# Patient Record
Sex: MALE | Race: WHITE | NOT HISPANIC OR LATINO | Employment: UNEMPLOYED | URBAN - METROPOLITAN AREA
[De-identification: names, ages, dates, MRNs, and addresses within clinical notes are randomized per-mention and may not be internally consistent; named-entity substitution may affect disease eponyms.]

---
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
## 2023-01-01 ENCOUNTER — HOSPITAL ENCOUNTER (EMERGENCY)
Facility: HOSPITAL | Age: 18
Discharge: HOME | End: 2023-01-01
Attending: EMERGENCY MEDICINE
Payer: MEDICAID

## 2023-01-01 VITALS
BODY MASS INDEX: 19.25 KG/M2 | SYSTOLIC BLOOD PRESSURE: 123 MMHG | HEART RATE: 98 BPM | TEMPERATURE: 98 F | WEIGHT: 127 LBS | HEIGHT: 68 IN | OXYGEN SATURATION: 98 % | RESPIRATION RATE: 16 BRPM | DIASTOLIC BLOOD PRESSURE: 65 MMHG

## 2023-01-01 DIAGNOSIS — H60.11 CELLULITIS OF RIGHT PINNA: Primary | ICD-10-CM

## 2023-01-01 PROCEDURE — 25000003 PHARM REV CODE 250: Mod: ER | Performed by: EMERGENCY MEDICINE

## 2023-01-01 PROCEDURE — 99284 EMERGENCY DEPT VISIT MOD MDM: CPT | Mod: ER

## 2023-01-01 RX ORDER — CEPHALEXIN 500 MG/1
500 CAPSULE ORAL
Status: COMPLETED | OUTPATIENT
Start: 2023-01-01 | End: 2023-01-01

## 2023-01-01 RX ORDER — CEPHALEXIN 500 MG/1
500 CAPSULE ORAL 4 TIMES DAILY
Qty: 20 CAPSULE | Refills: 0 | Status: SHIPPED | OUTPATIENT
Start: 2023-01-01 | End: 2023-01-06

## 2023-01-01 RX ORDER — MUPIROCIN 20 MG/G
OINTMENT TOPICAL 3 TIMES DAILY
Qty: 22 G | Refills: 0 | Status: ON HOLD | OUTPATIENT
Start: 2023-01-01 | End: 2024-01-06 | Stop reason: HOSPADM

## 2023-01-01 RX ORDER — MUPIROCIN 20 MG/G
OINTMENT TOPICAL
Status: COMPLETED | OUTPATIENT
Start: 2023-01-01 | End: 2023-01-01

## 2023-01-01 RX ADMIN — MUPIROCIN: 20 OINTMENT TOPICAL at 08:01

## 2023-01-01 RX ADMIN — CEPHALEXIN 500 MG: 500 CAPSULE ORAL at 08:01

## 2023-01-01 NOTE — Clinical Note
"Rohan"Miko Chin was seen and treated in our emergency department on 1/1/2023.  He may return to work on 01/02/2023.       If you have any questions or concerns, please don't hesitate to call.      Meagan Maldonado RN    "

## 2023-01-02 NOTE — ED PROVIDER NOTES
Encounter Date: 1/1/2023       History     Chief Complaint   Patient presents with    Ear Fullness     Right ear discomfort x 2 years     The history is provided by the patient.   Ear Fullness  This is a new problem. Episode onset: 1 year. The problem occurs rarely. The problem has not changed since onset.Pertinent negatives include no chest pain, no abdominal pain, no headaches and no shortness of breath. Nothing aggravates the symptoms. Nothing relieves the symptoms. Pt reports ear fullness, and states he picks at his outer ear causing a wound that is now infected, red and painful.    Review of patient's allergies indicates:  No Known Allergies  No past medical history on file.  No past surgical history on file.  No family history on file.  Social History     Tobacco Use    Smoking status: Never    Smokeless tobacco: Never   Substance Use Topics    Alcohol use: No    Drug use: Yes     Frequency: 1.0 times per week     Types: Marijuana     Review of Systems   Constitutional:  Negative for fever.   HENT:  Positive for ear pain.    Eyes:  Negative for photophobia.   Respiratory:  Negative for shortness of breath.    Cardiovascular:  Negative for chest pain.   Gastrointestinal:  Negative for abdominal pain.   Musculoskeletal:  Negative for back pain.   Neurological:  Negative for headaches.   Hematological:  Does not bruise/bleed easily.     Physical Exam     Initial Vitals   BP Pulse Resp Temp SpO2   01/01/23 1959 01/01/23 1959 01/01/23 1959 01/01/23 2001 01/01/23 1959   123/65 98 16 97.9 °F (36.6 °C) 98 %      MAP       --                Physical Exam    Nursing note and vitals reviewed.  Constitutional: He appears well-developed and well-nourished.   HENT:   Head: Normocephalic and atraumatic.   Right Ear: Hearing, tympanic membrane and ear canal normal.   Mouth/Throat: Oropharynx is clear and moist.   Right external ear, crusted wound c surrounding erythema antitragus No fluctuance Mild induration   Eyes:  Conjunctivae and EOM are normal. Pupils are equal, round, and reactive to light.   Neck: Neck supple.   Normal range of motion.  Cardiovascular:  Normal rate, regular rhythm and normal heart sounds.           Pulmonary/Chest: Breath sounds normal.   Abdominal: Abdomen is soft. Bowel sounds are normal.   Musculoskeletal:         General: Normal range of motion.      Cervical back: Normal range of motion and neck supple.     Neurological: He is alert and oriented to person, place, and time. He has normal strength.   Skin: Skin is warm and dry.   Psychiatric: He has a normal mood and affect. Thought content normal.       ED Course   Procedures  Labs Reviewed - No data to display       Imaging Results    None     8:27 PM - Counseling: Spoke with the patient and discussed todays findings, in addition to providing specific details for the plan of care and counseling regarding the diagnosis and prognosis. Questions are answered at this time.       Medications   cephALEXin capsule 500 mg (has no administration in time range)   mupirocin 2 % ointment (has no administration in time range)                              Clinical Impression:   Final diagnoses:  [H60.11] Cellulitis of right pinna (Primary)        ED Disposition Condition    Discharge Stable          ED Prescriptions       Medication Sig Dispense Start Date End Date Auth. Provider    cephALEXin (KEFLEX) 500 MG capsule Take 1 capsule (500 mg total) by mouth 4 (four) times daily. for 5 days 20 capsule 1/1/2023 1/6/2023 Balaji Myers MD    mupirocin (BACTROBAN) 2 % ointment Apply topically 3 (three) times daily. 22 g 1/1/2023 -- Balaji Myers MD          Follow-up Information       Follow up With Specialties Details Why Contact Info    ENT  Call in 1 week As needed     PCP  Call in 3 days For wound re-check, ENT referral              Balaji Myers MD  01/01/23 2027

## 2023-08-24 ENCOUNTER — HOSPITAL ENCOUNTER (OUTPATIENT)
Facility: HOSPITAL | Age: 18
Discharge: LEFT BEFORE DISCHARGE | End: 2023-08-25
Attending: EMERGENCY MEDICINE | Admitting: INTERNAL MEDICINE
Payer: MEDICAID

## 2023-08-24 DIAGNOSIS — R09.02 HYPOXIA: Primary | ICD-10-CM

## 2023-08-24 DIAGNOSIS — R06.00 DYSPNEA: ICD-10-CM

## 2023-08-24 DIAGNOSIS — R21 RASH: ICD-10-CM

## 2023-08-24 DIAGNOSIS — R00.0 TACHYCARDIA: ICD-10-CM

## 2023-08-24 DIAGNOSIS — R07.9 CHEST PAIN: ICD-10-CM

## 2023-08-24 DIAGNOSIS — R06.2 WHEEZING: ICD-10-CM

## 2023-08-24 PROBLEM — H60.90 OTITIS EXTERNA: Status: ACTIVE | Noted: 2023-08-24

## 2023-08-24 PROBLEM — J18.9 BILATERAL PNEUMONIA: Status: ACTIVE | Noted: 2023-08-24

## 2023-08-24 PROBLEM — J20.9 ACUTE BRONCHITIS: Status: ACTIVE | Noted: 2023-08-24

## 2023-08-24 PROBLEM — J96.01 ACUTE HYPOXEMIC RESPIRATORY FAILURE: Status: ACTIVE | Noted: 2023-08-24

## 2023-08-24 LAB
ALBUMIN SERPL BCP-MCNC: 4.7 G/DL (ref 3.5–5.2)
ALLENS TEST: ABNORMAL
ALP SERPL-CCNC: 61 U/L (ref 55–135)
ALT SERPL W/O P-5'-P-CCNC: 11 U/L (ref 10–44)
ANION GAP SERPL CALC-SCNC: 17 MMOL/L (ref 8–16)
AST SERPL-CCNC: 19 U/L (ref 10–40)
BASOPHILS # BLD AUTO: 0.14 K/UL (ref 0–0.2)
BASOPHILS NFR BLD: 1 % (ref 0–1.9)
BILIRUB SERPL-MCNC: 0.7 MG/DL (ref 0.1–1)
BILIRUB UR QL STRIP: ABNORMAL
BUN SERPL-MCNC: 7 MG/DL (ref 6–20)
CALCIUM SERPL-MCNC: 10.1 MG/DL (ref 8.7–10.5)
CHLORIDE SERPL-SCNC: 101 MMOL/L (ref 95–110)
CLARITY UR REFRACT.AUTO: CLEAR
CO2 SERPL-SCNC: 20 MMOL/L (ref 23–29)
COLOR UR AUTO: YELLOW
CREAT SERPL-MCNC: 0.8 MG/DL (ref 0.5–1.4)
CTP QC/QA: YES
CTP QC/QA: YES
DELSYS: ABNORMAL
DIFFERENTIAL METHOD: ABNORMAL
EOSINOPHIL # BLD AUTO: 0.8 K/UL (ref 0–0.5)
EOSINOPHIL NFR BLD: 5.6 % (ref 0–8)
ERYTHROCYTE [DISTWIDTH] IN BLOOD BY AUTOMATED COUNT: 11.9 % (ref 11.5–14.5)
EST. GFR  (NO RACE VARIABLE): >60 ML/MIN/1.73 M^2
GLUCOSE SERPL-MCNC: 96 MG/DL (ref 70–110)
GLUCOSE UR QL STRIP: NEGATIVE
HCO3 UR-SCNC: 18.1 MMOL/L (ref 24–28)
HCT VFR BLD AUTO: 51.2 % (ref 40–54)
HGB BLD-MCNC: 18.3 G/DL (ref 14–18)
HGB UR QL STRIP: NEGATIVE
IMM GRANULOCYTES # BLD AUTO: 0.04 K/UL (ref 0–0.04)
IMM GRANULOCYTES NFR BLD AUTO: 0.3 % (ref 0–0.5)
KETONES UR QL STRIP: ABNORMAL
LACTATE SERPL-SCNC: 1 MMOL/L (ref 0.5–2.2)
LEUKOCYTE ESTERASE UR QL STRIP: NEGATIVE
LYMPHOCYTES # BLD AUTO: 1.2 K/UL (ref 1–4.8)
LYMPHOCYTES NFR BLD: 8.2 % (ref 18–48)
MCH RBC QN AUTO: 32 PG (ref 27–31)
MCHC RBC AUTO-ENTMCNC: 35.7 G/DL (ref 32–36)
MCV RBC AUTO: 90 FL (ref 82–98)
MODE: ABNORMAL
MONOCYTES # BLD AUTO: 0.6 K/UL (ref 0.3–1)
MONOCYTES NFR BLD: 3.9 % (ref 4–15)
NEUTROPHILS # BLD AUTO: 11.8 K/UL (ref 1.8–7.7)
NEUTROPHILS NFR BLD: 81 % (ref 38–73)
NITRITE UR QL STRIP: NEGATIVE
NRBC BLD-RTO: 0 /100 WBC
PCO2 BLDA: 29.6 MMHG (ref 35–45)
PH SMN: 7.39 [PH] (ref 7.35–7.45)
PH UR STRIP: 6 [PH] (ref 5–8)
PLATELET # BLD AUTO: 384 K/UL (ref 150–450)
PMV BLD AUTO: 10.1 FL (ref 9.2–12.9)
PO2 BLDA: 68 MMHG (ref 80–100)
POC BE: -7 MMOL/L
POC MOLECULAR INFLUENZA A AGN: NEGATIVE
POC MOLECULAR INFLUENZA B AGN: NEGATIVE
POC SATURATED O2: 94 % (ref 95–100)
POTASSIUM SERPL-SCNC: 4.5 MMOL/L (ref 3.5–5.1)
PROCALCITONIN SERPL IA-MCNC: 0.02 NG/ML
PROCALCITONIN SERPL IA-MCNC: 0.02 NG/ML
PROT SERPL-MCNC: 8.3 G/DL (ref 6–8.4)
PROT UR QL STRIP: NEGATIVE
RBC # BLD AUTO: 5.71 M/UL (ref 4.6–6.2)
SAMPLE: ABNORMAL
SARS-COV-2 RDRP RESP QL NAA+PROBE: NEGATIVE
SITE: ABNORMAL
SODIUM SERPL-SCNC: 138 MMOL/L (ref 136–145)
SP GR UR STRIP: >=1.03 (ref 1–1.03)
URN SPEC COLLECT METH UR: ABNORMAL
UROBILINOGEN UR STRIP-ACNC: NEGATIVE EU/DL
WBC # BLD AUTO: 14.56 K/UL (ref 3.9–12.7)

## 2023-08-24 PROCEDURE — 87070 CULTURE OTHR SPECIMN AEROBIC: CPT | Performed by: NURSE PRACTITIONER

## 2023-08-24 PROCEDURE — 87502 INFLUENZA DNA AMP PROBE: CPT | Mod: ER

## 2023-08-24 PROCEDURE — 84145 PROCALCITONIN (PCT): CPT | Mod: 91 | Performed by: NURSE PRACTITIONER

## 2023-08-24 PROCEDURE — 93010 ELECTROCARDIOGRAM REPORT: CPT | Mod: ,,, | Performed by: INTERNAL MEDICINE

## 2023-08-24 PROCEDURE — 96372 THER/PROPH/DIAG INJ SC/IM: CPT | Mod: 59 | Performed by: NURSE PRACTITIONER

## 2023-08-24 PROCEDURE — 84145 PROCALCITONIN (PCT): CPT | Mod: ER | Performed by: EMERGENCY MEDICINE

## 2023-08-24 PROCEDURE — 63600175 PHARM REV CODE 636 W HCPCS: Performed by: INTERNAL MEDICINE

## 2023-08-24 PROCEDURE — 87077 CULTURE AEROBIC IDENTIFY: CPT | Performed by: NURSE PRACTITIONER

## 2023-08-24 PROCEDURE — 87040 BLOOD CULTURE FOR BACTERIA: CPT | Mod: 59 | Performed by: EMERGENCY MEDICINE

## 2023-08-24 PROCEDURE — 27100107 HC POCKET PEAK FLOW METER: Mod: ER

## 2023-08-24 PROCEDURE — 96368 THER/DIAG CONCURRENT INF: CPT | Mod: ER

## 2023-08-24 PROCEDURE — G0378 HOSPITAL OBSERVATION PER HR: HCPCS

## 2023-08-24 PROCEDURE — 96365 THER/PROPH/DIAG IV INF INIT: CPT | Mod: ER,59

## 2023-08-24 PROCEDURE — 87635 SARS-COV-2 COVID-19 AMP PRB: CPT | Mod: ER | Performed by: EMERGENCY MEDICINE

## 2023-08-24 PROCEDURE — 36415 COLL VENOUS BLD VENIPUNCTURE: CPT | Performed by: NURSE PRACTITIONER

## 2023-08-24 PROCEDURE — 83605 ASSAY OF LACTIC ACID: CPT | Mod: ER | Performed by: EMERGENCY MEDICINE

## 2023-08-24 PROCEDURE — 25000242 PHARM REV CODE 250 ALT 637 W/ HCPCS: Performed by: INTERNAL MEDICINE

## 2023-08-24 PROCEDURE — 81003 URINALYSIS AUTO W/O SCOPE: CPT | Mod: ER | Performed by: EMERGENCY MEDICINE

## 2023-08-24 PROCEDURE — 99900035 HC TECH TIME PER 15 MIN (STAT): Mod: ER

## 2023-08-24 PROCEDURE — 63600175 PHARM REV CODE 636 W HCPCS: Mod: ER | Performed by: INTERNAL MEDICINE

## 2023-08-24 PROCEDURE — 94640 AIRWAY INHALATION TREATMENT: CPT | Mod: XB

## 2023-08-24 PROCEDURE — 94761 N-INVAS EAR/PLS OXIMETRY MLT: CPT | Mod: ER

## 2023-08-24 PROCEDURE — 94640 AIRWAY INHALATION TREATMENT: CPT | Mod: ER,XB

## 2023-08-24 PROCEDURE — 87186 SC STD MICRODIL/AGAR DIL: CPT | Performed by: NURSE PRACTITIONER

## 2023-08-24 PROCEDURE — 25000003 PHARM REV CODE 250: Mod: ER | Performed by: EMERGENCY MEDICINE

## 2023-08-24 PROCEDURE — 85025 COMPLETE CBC W/AUTO DIFF WBC: CPT | Mod: ER | Performed by: EMERGENCY MEDICINE

## 2023-08-24 PROCEDURE — 96375 TX/PRO/DX INJ NEW DRUG ADDON: CPT

## 2023-08-24 PROCEDURE — 36600 WITHDRAWAL OF ARTERIAL BLOOD: CPT | Mod: ER

## 2023-08-24 PROCEDURE — 96361 HYDRATE IV INFUSION ADD-ON: CPT | Mod: ER

## 2023-08-24 PROCEDURE — 25000242 PHARM REV CODE 250 ALT 637 W/ HCPCS: Performed by: NURSE PRACTITIONER

## 2023-08-24 PROCEDURE — 80053 COMPREHEN METABOLIC PANEL: CPT | Mod: ER | Performed by: EMERGENCY MEDICINE

## 2023-08-24 PROCEDURE — 93010 EKG 12-LEAD: ICD-10-PCS | Mod: ,,, | Performed by: INTERNAL MEDICINE

## 2023-08-24 PROCEDURE — 63600175 PHARM REV CODE 636 W HCPCS: Mod: ER | Performed by: EMERGENCY MEDICINE

## 2023-08-24 PROCEDURE — 87205 SMEAR GRAM STAIN: CPT | Performed by: NURSE PRACTITIONER

## 2023-08-24 PROCEDURE — 96367 TX/PROPH/DG ADDL SEQ IV INF: CPT

## 2023-08-24 PROCEDURE — 63600175 PHARM REV CODE 636 W HCPCS: Performed by: NURSE PRACTITIONER

## 2023-08-24 PROCEDURE — 25500020 PHARM REV CODE 255: Mod: ER | Performed by: INTERNAL MEDICINE

## 2023-08-24 PROCEDURE — 96361 HYDRATE IV INFUSION ADD-ON: CPT

## 2023-08-24 PROCEDURE — 25000003 PHARM REV CODE 250: Performed by: NURSE PRACTITIONER

## 2023-08-24 PROCEDURE — 99291 CRITICAL CARE FIRST HOUR: CPT | Mod: ER

## 2023-08-24 PROCEDURE — 93005 ELECTROCARDIOGRAM TRACING: CPT | Mod: ER

## 2023-08-24 PROCEDURE — 25000242 PHARM REV CODE 250 ALT 637 W/ HCPCS: Mod: ER | Performed by: EMERGENCY MEDICINE

## 2023-08-24 PROCEDURE — 27000221 HC OXYGEN, UP TO 24 HOURS: Mod: ER

## 2023-08-24 PROCEDURE — 99900035 HC TECH TIME PER 15 MIN (STAT)

## 2023-08-24 RX ORDER — SODIUM,POTASSIUM PHOSPHATES 280-250MG
2 POWDER IN PACKET (EA) ORAL
Status: DISCONTINUED | OUTPATIENT
Start: 2023-08-24 | End: 2023-08-26 | Stop reason: HOSPADM

## 2023-08-24 RX ORDER — ARFORMOTEROL TARTRATE 15 UG/2ML
15 SOLUTION RESPIRATORY (INHALATION) 2 TIMES DAILY
Status: DISCONTINUED | OUTPATIENT
Start: 2023-08-24 | End: 2023-08-26 | Stop reason: HOSPADM

## 2023-08-24 RX ORDER — SIMETHICONE 80 MG
1 TABLET,CHEWABLE ORAL 4 TIMES DAILY PRN
Status: DISCONTINUED | OUTPATIENT
Start: 2023-08-24 | End: 2023-08-26 | Stop reason: HOSPADM

## 2023-08-24 RX ORDER — IBUPROFEN 200 MG
16 TABLET ORAL
Status: DISCONTINUED | OUTPATIENT
Start: 2023-08-24 | End: 2023-08-26 | Stop reason: HOSPADM

## 2023-08-24 RX ORDER — IBUPROFEN 200 MG
24 TABLET ORAL
Status: DISCONTINUED | OUTPATIENT
Start: 2023-08-24 | End: 2023-08-26 | Stop reason: HOSPADM

## 2023-08-24 RX ORDER — TALC
6 POWDER (GRAM) TOPICAL NIGHTLY PRN
Status: DISCONTINUED | OUTPATIENT
Start: 2023-08-24 | End: 2023-08-26 | Stop reason: HOSPADM

## 2023-08-24 RX ORDER — HYDROCODONE BITARTRATE AND ACETAMINOPHEN 5; 325 MG/1; MG/1
1 TABLET ORAL EVERY 6 HOURS PRN
Status: DISCONTINUED | OUTPATIENT
Start: 2023-08-24 | End: 2023-08-26 | Stop reason: HOSPADM

## 2023-08-24 RX ORDER — NEOMYCIN SULFATE, POLYMYXIN B SULFATE, HYDROCORTISONE 3.5; 10000; 1 MG/ML; [USP'U]/ML; MG/ML
3 SOLUTION/ DROPS AURICULAR (OTIC) EVERY 6 HOURS
Status: DISCONTINUED | OUTPATIENT
Start: 2023-08-25 | End: 2023-08-24

## 2023-08-24 RX ORDER — ENOXAPARIN SODIUM 100 MG/ML
40 INJECTION SUBCUTANEOUS EVERY 24 HOURS
Status: DISCONTINUED | OUTPATIENT
Start: 2023-08-24 | End: 2023-08-26 | Stop reason: HOSPADM

## 2023-08-24 RX ORDER — NALOXONE HCL 0.4 MG/ML
0.02 VIAL (ML) INJECTION
Status: DISCONTINUED | OUTPATIENT
Start: 2023-08-24 | End: 2023-08-26 | Stop reason: HOSPADM

## 2023-08-24 RX ORDER — MAG HYDROX/ALUMINUM HYD/SIMETH 200-200-20
30 SUSPENSION, ORAL (FINAL DOSE FORM) ORAL 4 TIMES DAILY PRN
Status: DISCONTINUED | OUTPATIENT
Start: 2023-08-24 | End: 2023-08-26 | Stop reason: HOSPADM

## 2023-08-24 RX ORDER — PROCHLORPERAZINE EDISYLATE 5 MG/ML
5 INJECTION INTRAMUSCULAR; INTRAVENOUS EVERY 6 HOURS PRN
Status: DISCONTINUED | OUTPATIENT
Start: 2023-08-24 | End: 2023-08-26 | Stop reason: HOSPADM

## 2023-08-24 RX ORDER — ONDANSETRON 2 MG/ML
4 INJECTION INTRAMUSCULAR; INTRAVENOUS EVERY 6 HOURS PRN
Status: DISCONTINUED | OUTPATIENT
Start: 2023-08-24 | End: 2023-08-26 | Stop reason: HOSPADM

## 2023-08-24 RX ORDER — GLUCAGON 1 MG
1 KIT INJECTION
Status: DISCONTINUED | OUTPATIENT
Start: 2023-08-24 | End: 2023-08-26 | Stop reason: HOSPADM

## 2023-08-24 RX ORDER — IPRATROPIUM BROMIDE AND ALBUTEROL SULFATE 2.5; .5 MG/3ML; MG/3ML
3 SOLUTION RESPIRATORY (INHALATION) EVERY 6 HOURS PRN
Status: DISCONTINUED | OUTPATIENT
Start: 2023-08-24 | End: 2023-08-26 | Stop reason: HOSPADM

## 2023-08-24 RX ORDER — LANOLIN ALCOHOL/MO/W.PET/CERES
800 CREAM (GRAM) TOPICAL
Status: DISCONTINUED | OUTPATIENT
Start: 2023-08-24 | End: 2023-08-26 | Stop reason: HOSPADM

## 2023-08-24 RX ORDER — LEVALBUTEROL INHALATION SOLUTION 0.63 MG/3ML
1.25 SOLUTION RESPIRATORY (INHALATION) EVERY 8 HOURS
Status: DISCONTINUED | OUTPATIENT
Start: 2023-08-25 | End: 2023-08-25

## 2023-08-24 RX ORDER — SODIUM CHLORIDE 0.9 % (FLUSH) 0.9 %
10 SYRINGE (ML) INJECTION EVERY 8 HOURS PRN
Status: DISCONTINUED | OUTPATIENT
Start: 2023-08-24 | End: 2023-08-26 | Stop reason: HOSPADM

## 2023-08-24 RX ORDER — MAGNESIUM SULFATE HEPTAHYDRATE 40 MG/ML
2 INJECTION, SOLUTION INTRAVENOUS ONCE
Status: COMPLETED | OUTPATIENT
Start: 2023-08-24 | End: 2023-08-24

## 2023-08-24 RX ORDER — SODIUM CHLORIDE 9 MG/ML
INJECTION, SOLUTION INTRAVENOUS CONTINUOUS
Status: DISCONTINUED | OUTPATIENT
Start: 2023-08-24 | End: 2023-08-26 | Stop reason: HOSPADM

## 2023-08-24 RX ORDER — BUDESONIDE 0.5 MG/2ML
0.5 INHALANT ORAL EVERY 12 HOURS
Status: DISCONTINUED | OUTPATIENT
Start: 2023-08-24 | End: 2023-08-26 | Stop reason: HOSPADM

## 2023-08-24 RX ORDER — CLOTRIMAZOLE AND BETAMETHASONE DIPROPIONATE 10; .64 MG/G; MG/G
CREAM TOPICAL 2 TIMES DAILY
Status: DISCONTINUED | OUTPATIENT
Start: 2023-08-24 | End: 2023-08-26 | Stop reason: HOSPADM

## 2023-08-24 RX ORDER — CLOTRIMAZOLE AND BETAMETHASONE DIPROPIONATE 10; .64 MG/G; MG/G
CREAM TOPICAL 2 TIMES DAILY
Status: DISCONTINUED | OUTPATIENT
Start: 2023-08-24 | End: 2023-08-24

## 2023-08-24 RX ORDER — LEVALBUTEROL INHALATION SOLUTION 0.63 MG/3ML
0.63 SOLUTION RESPIRATORY (INHALATION)
Status: COMPLETED | OUTPATIENT
Start: 2023-08-24 | End: 2023-08-24

## 2023-08-24 RX ORDER — ACETAMINOPHEN 325 MG/1
650 TABLET ORAL EVERY 4 HOURS PRN
Status: DISCONTINUED | OUTPATIENT
Start: 2023-08-24 | End: 2023-08-26 | Stop reason: HOSPADM

## 2023-08-24 RX ORDER — NEOMYCIN SULFATE, POLYMYXIN B SULFATE, HYDROCORTISONE 3.5; 10000; 1 MG/ML; [USP'U]/ML; MG/ML
3 SOLUTION/ DROPS AURICULAR (OTIC) EVERY 6 HOURS
Status: DISCONTINUED | OUTPATIENT
Start: 2023-08-24 | End: 2023-08-26 | Stop reason: HOSPADM

## 2023-08-24 RX ORDER — POLYETHYLENE GLYCOL 3350 17 G/17G
17 POWDER, FOR SOLUTION ORAL 2 TIMES DAILY PRN
Status: DISCONTINUED | OUTPATIENT
Start: 2023-08-24 | End: 2023-08-26 | Stop reason: HOSPADM

## 2023-08-24 RX ADMIN — CLOTRIMAZOLE AND BETAMETHASONE DIPROPIONATE: 10; .64 CREAM TOPICAL at 10:08

## 2023-08-24 RX ADMIN — CEFTRIAXONE SODIUM 1 G: 1 INJECTION, POWDER, FOR SOLUTION INTRAMUSCULAR; INTRAVENOUS at 06:08

## 2023-08-24 RX ADMIN — ONDANSETRON 4 MG: 2 INJECTION INTRAMUSCULAR; INTRAVENOUS at 11:08

## 2023-08-24 RX ADMIN — IPRATROPIUM BROMIDE AND ALBUTEROL SULFATE 3 ML: .5; 3 SOLUTION RESPIRATORY (INHALATION) at 10:08

## 2023-08-24 RX ADMIN — METHYLPREDNISOLONE SODIUM SUCCINATE 60 MG: 40 INJECTION, POWDER, FOR SOLUTION INTRAMUSCULAR; INTRAVENOUS at 10:08

## 2023-08-24 RX ADMIN — BUDESONIDE 0.5 MG: 0.5 INHALANT ORAL at 10:08

## 2023-08-24 RX ADMIN — IOHEXOL 100 ML: 350 INJECTION, SOLUTION INTRAVENOUS at 06:08

## 2023-08-24 RX ADMIN — LEVALBUTEROL HYDROCHLORIDE 0.63 MG: 0.63 SOLUTION RESPIRATORY (INHALATION) at 03:08

## 2023-08-24 RX ADMIN — ENOXAPARIN SODIUM 40 MG: 40 INJECTION SUBCUTANEOUS at 10:08

## 2023-08-24 RX ADMIN — SODIUM CHLORIDE: 9 INJECTION, SOLUTION INTRAVENOUS at 11:08

## 2023-08-24 RX ADMIN — MAGNESIUM SULFATE IN WATER 2 G: 40 INJECTION, SOLUTION INTRAVENOUS at 06:08

## 2023-08-24 RX ADMIN — AZITHROMYCIN MONOHYDRATE 500 MG: 500 INJECTION, POWDER, LYOPHILIZED, FOR SOLUTION INTRAVENOUS at 10:08

## 2023-08-24 RX ADMIN — SODIUM CHLORIDE 1000 ML: 9 INJECTION, SOLUTION INTRAVENOUS at 06:08

## 2023-08-24 RX ADMIN — ARFORMOTEROL TARTRATE 15 MCG: 15 SOLUTION RESPIRATORY (INHALATION) at 10:08

## 2023-08-24 RX ADMIN — SODIUM CHLORIDE 1000 ML: 9 INJECTION, SOLUTION INTRAVENOUS at 03:08

## 2023-08-24 RX ADMIN — NEOMYCIN SULFATE, POLYMYXIN B SULFATE, HYDROCORTISONE 3 DROP: 3.5; 10000; 1 SOLUTION/ DROPS AURICULAR (OTIC) at 10:08

## 2023-08-24 NOTE — ED PROVIDER NOTES
Emergency Medicine Provider Note - 8/24/2023        History     Chief Complaint   Patient presents with    Nasal Congestion     Nasal congestion, cough , onset 2 weeks ago, sent over from urgent care           History of Present Illness   HPI    8/24/2023, 2:56 PM  The history is provided by the patient, mother and notes from clinic.     Rohan Chin is a 17 y.o. male presenting to the ED for dyspnea.  Patient reports that he started feeling short of breath approximately 10 days ago.  He reports that he had been vaping at that time.  He stopped vaping.  However the cough has persisted.  Patient notes 1-2 days nasal congestion.  Patient denies any calf pain, calf tenderness, fever, chills, diarrhea, dysuria, urgency, frequency.  Patient does note post stuff is emesis.  Patient had been referred to the emergency department from primary care provider for sats of 88%.  Patient was given an IM injection of dexamethasone 8 mg.  He notes that he has chronic problems with his ears.  He has had redness to his ears.  They flare from time to time.  They believe it could be related to a fungal infection.  Patient states that his ears started flaring a couple of days ago.    His mother states that today he was complaining of worsening cough and shortness of breath.  She had used a nebulizer from home to treat him.  No history of asthma.    Reviewed notes from primary care provider:                      Arrival mode:  Personal Vehicle      PCP: No, Primary Doctor     Allergies:  Review of patient's allergies indicates:  No Known Allergies    Past Medical History:  Past Medical History:   Diagnosis Date    Vapes nicotine containing substance        Past Surgical History:  Past Surgical History:   Procedure Laterality Date    INCISION AND DRAINAGE OF ABSCESS      scalp         Family History:  Family History   Problem Relation Age of Onset    No Known Problems Mother     Ulcerative colitis Father        Social History:  Social  History     Tobacco Use    Smoking status: Every Day     Types: Vaping with nicotine    Smokeless tobacco: Never   Substance and Sexual Activity    Alcohol use: No    Drug use: Yes     Frequency: 1.0 times per week     Types: Marijuana    Sexual activity: Not on file       Triage note, Allergies, Past Medical History, Past Surgical History, Family History and Social History reviewed as documented above.     Review of Systems   Review of Systems   Constitutional:  Positive for fatigue. Negative for chills and fever.   HENT:  Positive for ear discharge and rhinorrhea (Only 1 -2 days.). Negative for sore throat.    Respiratory:  Positive for cough. Negative for shortness of breath.    Cardiovascular:  Negative for chest pain, palpitations and leg swelling.   Gastrointestinal:  Positive for vomiting (Post Tussive). Negative for nausea.   Genitourinary:  Negative for dysuria.   Musculoskeletal:  Negative for back pain.   Skin:  Negative for rash.   Neurological:  Negative for weakness.   Hematological:  Does not bruise/bleed easily.          Physical Exam     Initial Vitals [08/24/23 1452]   BP Pulse Resp Temp SpO2   (!) 141/91 (!) 135 (!) 22 97.7 °F (36.5 °C) (!) 91 %      MAP       --          Physical Exam    Nursing Notes and Vital Signs Reviewed.  Constitutional: Patient is in no apparent distress. Well-developed and well-nourished.  Head: Atraumatic. Normocephalic.  Eyes: PERRL. EOM intact. Conjunctivae are not pale. No scleral icterus.  ENT: Mucous membranes are moist. Oropharynx is clear and symmetric.  TMs pearly gray bilaterally.  There is some redness at the  Neck: Supple. Full ROM. No lymphadenopathy.  Cardiovascular:  Tachycardic.  Regular rate. Regular rhythm. No murmurs, rubs, or gallops. Distal pulses are 2+ and symmetric.  Pulmonary/Chest:  Tachypnea.  Diffuse wheezing noted.  Abdominal: Soft and non-distended.  There is no tenderness.  No rebound, guarding, or rigidity. Good bowel  sounds.  Musculoskeletal: Moves all extremities. No obvious deformities. No edema. No calf tenderness.  Skin: Warm and dry.  Neurological:  Alert, awake, and appropriate.  Normal speech.  No acute focal neurological deficits are appreciated.  Psychiatric: Normal affect. Good eye contact. Appropriate in content.    Right ear:        Left ear:        ED Course     ED Procedures:  Critical Care    Date/Time: 8/24/2023 2:56 PM    Performed by: Chelsea Bradford DO  Authorized by: Roz Calderón MD  Direct patient critical care time: 20 minutes  Additional history critical care time: 5 minutes  Ordering / reviewing critical care time: 5 minutes  Documentation critical care time: 10 minutes  Consulting other physicians critical care time: 5 minutes  Total critical care time (exclusive of procedural time) : 45 minutes  Critical care time was exclusive of separately billable procedures and treating other patients.  Critical care was necessary to treat or prevent imminent or life-threatening deterioration of the following conditions: respiratory failure.  Critical care was time spent personally by me on the following activities: blood draw for specimens, development of treatment plan with patient or surrogate, discussions with consultants, interpretation of cardiac output measurements, evaluation of patient's response to treatment, examination of patient, obtaining history from patient or surrogate, ordering and performing treatments and interventions, ordering and review of laboratory studies, ordering and review of radiographic studies, pulse oximetry, re-evaluation of patient's condition, review of old charts and vascular access procedures.          ED Vital Signs:  Vitals:    08/24/23 1817 08/24/23 1821 08/24/23 1922 08/24/23 1932   BP: 132/73  138/76 124/71   Pulse: 95 101 110 104   Resp: 20 19 18 (!) 29   Temp:       TempSrc:       SpO2: 96% 97% 95% 95%   Weight:       Height:        08/24/23 2116 08/24/23 2243  08/24/23 2300 08/25/23 0033   BP: 129/82   129/75   Pulse: 89 94 (!) 116 102   Resp: 19 17  18   Temp: 97.6 °F (36.4 °C)   97.9 °F (36.6 °C)   TempSrc: Oral   Oral   SpO2: (!) 93% 95%  (!) 93%   Weight:       Height:        08/25/23 0100 08/25/23 0133 08/25/23 0300 08/25/23 0420   BP:    122/70   Pulse: 105 105 101 105   Resp:  17  18   Temp:    97.8 °F (36.6 °C)   TempSrc:    Oral   SpO2:  (!) 94%  (!) 92%   Weight:       Height:        08/25/23 0717 08/25/23 0718 08/25/23 0754   BP:   122/68   Pulse: 102 102 91   Resp: 20 20 18   Temp:   97.7 °F (36.5 °C)   TempSrc:      SpO2:  (!) 91% (!) 94%   Weight:      Height:          Abnormal Lab Results:  Labs Reviewed   CBC W/ AUTO DIFFERENTIAL - Abnormal; Notable for the following components:       Result Value    WBC 14.56 (*)     Hemoglobin 18.3 (*)     MCH 32.0 (*)     Gran # (ANC) 11.8 (*)     Eos # 0.8 (*)     Gran % 81.0 (*)     Lymph % 8.2 (*)     Mono % 3.9 (*)     All other components within normal limits   COMPREHENSIVE METABOLIC PANEL - Abnormal; Notable for the following components:    CO2 20 (*)     Anion Gap 17 (*)     All other components within normal limits   URINALYSIS, REFLEX TO URINE CULTURE - Abnormal; Notable for the following components:    Specific Gravity, UA >=1.030 (*)     Ketones, UA 3+ (*)     Bilirubin (UA) 1+ (*)     All other components within normal limits    Narrative:     Specimen Source->Urine   ISTAT PROCEDURE - Abnormal; Notable for the following components:    POC PCO2 29.6 (*)     POC PO2 68 (*)     POC HCO3 18.1 (*)     POC SATURATED O2 94 (*)     All other components within normal limits   LACTIC ACID, PLASMA   PROCALCITONIN   SARS-COV-2 RDRP GENE    Narrative:     .This test utilizes isothermal nucleic acid amplification technology to detect the SARS-CoV-2 RdRp nucleic acid segment. The analytical sensitivity (limit of detection) is 500 copies/swab.     A POSITIVE result is indicative of the presence of SARS-CoV-2 RNA; clinical  correlation with patient history and other diagnostic information is necessary to determine patient infection status.    A NEGATIVE result means that SARS-CoV-2 nucleic acids are not present above the limit of detection. A NEGATIVE result should be treated as presumptive. It does not rule out the possibility of COVID-19 and should not be the sole basis for treatment decisions. If COVID-19 is strongly suspected based on clinical and exposure history, re-testing using an alternate molecular assay should be considered.     This test is only for use under the Food and Drug Administration s Emergency Use Authorization (EUA).     Commercial kits are provided by [employer]. Performance characteristics of the EUA have been independently verified by [facility] Department of Pathology and Laboratory Medicine.   _________________________________________________________________   The authorized Fact Sheet for Healthcare Providers and the authorized Fact Sheet for Patients of the ID NOW COVID-19 are available on the FDA website:    https://www.fda.gov/media/878122/download      https://www.fda.gov/media/095016/download       POCT INFLUENZA A/B MOLECULAR        All Lab Results:  Results for orders placed or performed during the hospital encounter of 08/24/23   Blood culture x two cultures. Draw prior to antibiotics.    Specimen: Peripheral, Antecubital, Right; Blood   Result Value Ref Range    Blood Culture, Routine No Growth to date    Blood culture x two cultures. Draw prior to antibiotics.    Specimen: Peripheral, Forearm, Right; Blood   Result Value Ref Range    Blood Culture, Routine No Growth to date    CBC auto differential   Result Value Ref Range    WBC 14.56 (H) 3.90 - 12.70 K/uL    RBC 5.71 4.60 - 6.20 M/uL    Hemoglobin 18.3 (H) 14.0 - 18.0 g/dL    Hematocrit 51.2 40.0 - 54.0 %    MCV 90 82 - 98 fL    MCH 32.0 (H) 27.0 - 31.0 pg    MCHC 35.7 32.0 - 36.0 g/dL    RDW 11.9 11.5 - 14.5 %    Platelets  384 150 - 450 K/uL    MPV 10.1 9.2 - 12.9 fL    Immature Granulocytes 0.3 0.0 - 0.5 %    Gran # (ANC) 11.8 (H) 1.8 - 7.7 K/uL    Immature Grans (Abs) 0.04 0.00 - 0.04 K/uL    Lymph # 1.2 1.0 - 4.8 K/uL    Mono # 0.6 0.3 - 1.0 K/uL    Eos # 0.8 (H) 0.0 - 0.5 K/uL    Baso # 0.14 0.00 - 0.20 K/uL    nRBC 0 0 /100 WBC    Gran % 81.0 (H) 38.0 - 73.0 %    Lymph % 8.2 (L) 18.0 - 48.0 %    Mono % 3.9 (L) 4.0 - 15.0 %    Eosinophil % 5.6 0.0 - 8.0 %    Basophil % 1.0 0.0 - 1.9 %    Differential Method Automated    Comprehensive metabolic panel   Result Value Ref Range    Sodium 138 136 - 145 mmol/L    Potassium 4.5 3.5 - 5.1 mmol/L    Chloride 101 95 - 110 mmol/L    CO2 20 (L) 23 - 29 mmol/L    Glucose 96 70 - 110 mg/dL    BUN 7 6 - 20 mg/dL    Creatinine 0.8 0.5 - 1.4 mg/dL    Calcium 10.1 8.7 - 10.5 mg/dL    Total Protein 8.3 6.0 - 8.4 g/dL    Albumin 4.7 3.5 - 5.2 g/dL    Total Bilirubin 0.7 0.1 - 1.0 mg/dL    Alkaline Phosphatase 61 55 - 135 U/L    AST 19 10 - 40 U/L    ALT 11 10 - 44 U/L    eGFR >60.0 >60 mL/min/1.73 m^2    Anion Gap 17 (H) 8 - 16 mmol/L   Lactic acid, plasma #1   Result Value Ref Range    Lactate (Lactic Acid) 1.0 0.5 - 2.2 mmol/L   Urinalysis, Reflex to Urine Culture Urine, Clean Catch    Specimen: Urine   Result Value Ref Range    Specimen UA Urine, Clean Catch     Color, UA Yellow Yellow, Straw, Jenni    Appearance, UA Clear Clear    pH, UA 6.0 5.0 - 8.0    Specific Gravity, UA >=1.030 (A) 1.005 - 1.030    Protein, UA Negative Negative    Glucose, UA Negative Negative    Ketones, UA 3+ (A) Negative    Bilirubin (UA) 1+ (A) Negative    Occult Blood UA Negative Negative    Nitrite, UA Negative Negative    Urobilinogen, UA Negative <2.0 EU/dL    Leukocytes, UA Negative Negative   Procalcitonin   Result Value Ref Range    Procalcitonin 0.02 <0.25 ng/mL   Procalcitonin   Result Value Ref Range    Procalcitonin 0.02 <0.25 ng/mL   Comprehensive Metabolic Panel (CMP)   Result Value Ref Range    Sodium 139  136 - 145 mmol/L    Potassium 4.5 3.5 - 5.1 mmol/L    Chloride 105 95 - 110 mmol/L    CO2 22 (L) 23 - 29 mmol/L    Glucose 136 (H) 70 - 110 mg/dL    BUN 8 6 - 20 mg/dL    Creatinine 0.9 0.5 - 1.4 mg/dL    Calcium 9.7 8.7 - 10.5 mg/dL    Total Protein 7.3 6.0 - 8.4 g/dL    Albumin 4.1 3.5 - 5.2 g/dL    Total Bilirubin 0.4 0.1 - 1.0 mg/dL    Alkaline Phosphatase 57 55 - 135 U/L    AST 17 10 - 40 U/L    ALT 11 10 - 44 U/L    eGFR >60 >60 mL/min/1.73 m^2    Anion Gap 12 8 - 16 mmol/L   CBC with Automated Differential   Result Value Ref Range    WBC 10.66 3.90 - 12.70 K/uL    RBC 5.07 4.60 - 6.20 M/uL    Hemoglobin 16.1 14.0 - 18.0 g/dL    Hematocrit 46.0 40.0 - 54.0 %    MCV 91 82 - 98 fL    MCH 31.8 (H) 27.0 - 31.0 pg    MCHC 35.0 32.0 - 36.0 g/dL    RDW 11.9 11.5 - 14.5 %    Platelets 357 150 - 450 K/uL    MPV 10.2 9.2 - 12.9 fL    Immature Granulocytes 0.3 0.0 - 0.5 %    Gran # (ANC) 9.3 (H) 1.8 - 7.7 K/uL    Immature Grans (Abs) 0.03 0.00 - 0.04 K/uL    Lymph # 1.1 1.0 - 4.8 K/uL    Mono # 0.2 (L) 0.3 - 1.0 K/uL    Eos # 0.0 0.0 - 0.5 K/uL    Baso # 0.02 0.00 - 0.20 K/uL    nRBC 0 0 /100 WBC    Gran % 87.5 (H) 38.0 - 73.0 %    Lymph % 10.3 (L) 18.0 - 48.0 %    Mono % 1.6 (L) 4.0 - 15.0 %    Eosinophil % 0.1 0.0 - 8.0 %    Basophil % 0.2 0.0 - 1.9 %    Differential Method Automated    POCT COVID-19 Rapid Screening   Result Value Ref Range    POC Rapid COVID Negative Negative     Acceptable Yes    POCT Influenza A/B Molecular   Result Value Ref Range    POC Molecular Influenza A Ag Negative Negative, Not Reported    POC Molecular Influenza B Ag Negative Negative, Not Reported     Acceptable Yes    ISTAT PROCEDURE   Result Value Ref Range    POC PH 7.394 7.35 - 7.45    POC PCO2 29.6 (L) 35 - 45 mmHg    POC PO2 68 (L) 80 - 100 mmHg    POC HCO3 18.1 (L) 24 - 28 mmol/L    POC BE -7 -2 to 2 mmol/L    POC SATURATED O2 94 (L) 95 - 100 %    Sample ARTERIAL     Site RR     Allens Test Pass      [facility] Room Air     Mode SPONT        The EKG was ordered, reviewed, and independently interpreted by the ED provider.      ECG Results              EKG 12-lead (Final result)  Result time 08/24/23 17:25:44      Final result by Interface, Lab In [facility] (08/24/23 17:25:44)                   Narrative:    Test Reason : R00.0,    Vent. Rate : 088 BPM     Atrial Rate : 088 BPM     P-R Int : 116 ms          QRS Dur : 086 ms      QT Int : 344 ms       P-R-T Axes : 068 086 066 degrees     QTc Int : 416 ms    Normal sinus rhythm with sinus arrhythmia  Early repolarization  Normal ECG  No previous ECGs available  Confirmed by CATE SHARMA MD ([id number]) on 8/24/2023 5:25:34 PM    Referred By: [email or web address]   SELF           Confirmed By:CATE SHARMA MD                      Wet Read by Chelsea Bradford DO (08/24/23 15:46:04, [facility] - Emergency Dept, Emergency Medicine)    Rate of 88 beats per minute.  Sinus rhythm.  Sinus arrhythmia noted.  Normal axis pr early refill.  No STEMI                                    Imaging Results:  Imaging Results               CTA Chest Non-Coronary (PE Studies) (Final result)  Result time 08/24/23 18:54:32      Final result by Stephan Aragon MD (08/24/23 18:54:32)                   Impression:      No pulmonary embolism.    Small volume bilateral right greater than left subsolid opacities concerning for COVID pneumonia or other pneumonia.  Noninfectious inflammation or hemorrhage are also within the differential diagnosis.    This report was flagged in Epic as abnormal.    All CT scans at this facility are performed  using dose modulation techniques as appropriate to performed exam including the following:  automated exposure control; adjustment of mA and/or kV according to the patients size (this includes techniques or standardized protocols for targeted exams where dose is matched to indication/reason for exam: i.e. extremities or head);  iterative reconstruction  technique.      Electronically signed by: Stephan Aragon  Date:    08/24/2023  Time:    18:54               Narrative:    EXAMINATION:  CTA CHEST NON CORONARY (PE STUDIES)    CLINICAL HISTORY:  Pulmonary embolism (PE) suspected, unknown D-dimer;    TECHNIQUE:  Low dose axial images, sagittal and coronal reformations were obtained from the thoracic inlet to the lung bases following the IV administration of 100 mL of Omnipaque 350.  Contrast timing was optimized to evaluate the pulmonary arteries.  MIP images were performed.    COMPARISON:  None    FINDINGS:  Base of Neck: No significant abnormality.    Thoracic soft tissues: Unremarkable.    Aorta: Left-sided aortic arch.  No aneurysm and no significant atherosclerosis    Heart: Normal size. No effusion.    Pulmonary vasculature: Pulmonary arteries are well opacified.  There is no pulmonary thromboembolism.    [place]/Mediastinum: No pathologic beka enlargement.    Airways: Patent.    Lungs/Pleura: Small volume bilateral right greater than left subsolid opacities concerning for COVID pneumonia or other pneumonia.  Noninfectious inflammation or hemorrhage are also within the differential diagnosis.    Esophagus: Unremarkable.    Upper Abdomen: No abnormality of the partially imaged upper abdomen.    Bones: No acute fracture. No suspicious lytic or sclerotic lesions.                                       X-Ray Chest AP Portable (Final result)  Result time 08/24/23 15:12:12      Final result by Randolph Lieberman MD (08/24/23 15:12:12)                   Impression:      No acute cardiopulmonary disease.      Electronically signed by: Randolph Lieberman MD  Date:    08/24/2023  Time:    15:12               Narrative:    EXAMINATION:  XR CHEST AP PORTABLE    CLINICAL HISTORY:  Dyspnea, unspecified    COMPARISON:  None.    FINDINGS:  The lungs are clear. The cardiac silhouette is within normal limits.  No pleural effusion or pneumothorax or pulmonary edema.  The bones are intact.                                        Type of Interpretation: ED Physician (Independently Interpreted).  Radiology Procedure Done: Portable CXR.  Interpretation: No pneumothorax.  No infiltrate          The Emergency Provider reviewed the vital signs and test results, which are outlined above.     ED Discussion     ED Course as of 08/25/23 0804   Thu Aug 24, 2023   1512 Peak flow = 150 [LB]   1745 Patient's peak flow is still 150.  Sats are 91%.  Patient is still wheezing.  Recommend observation.  Patient is requesting [facility]. [LB]      ED Course User Index  [LB] Chelsea Bradford DO       6:20 PM   All historical, clinical, radiographic, and laboratory findings were reviewed with the patient/family in detail.  I discussed the indications and treatment need: (Internal Medicine) .    Patient/Family requests transfer to: [facility]    Patient/Family understands that [facility] does provide (Internal Medicine) services.     Patient/family verbalized understanding.   All remaining questions and concerns were addressed at that time and the patient/family agrees to proceed accordingly.  Similarly all pertinent details of the encounter were discussed with Phoebe Calderón MD at [facility] . MD Rebeka agrees to accept the patient in transfer based on the needs/patient preferences outlined above.  [facility] , secondary to a need for ongoing Antibiotics and Oxygen and pulse ox  en route.  Risks: of transfer:    inclement weather, loss of vitals signs, permanent neurologic damage, MVC, loss of current lifestyle,  and/or death.  Benefits of transfer: Internal Medicine.  Patient and/or family agree and verbalize understanding.     Chelsea Bradford DO,  FACEP        ED Medication(s):  Medications   levalbuterol nebulizer solution 1.2495 mg (1.2495 mg Nebulization Given  8/25/23 0717)   budesonide nebulizer solution 0.5 mg (0.5 mg Nebulization Given 8/25/23 0717)   methylPREDNISolone sodium succinate injection 60 mg (60 mg Intravenous Given 8/25/23 0539)   cefTRIAXone (ROCEPHIN) 1 g in dextrose 5 % in water (D5W) 100 mL IVPB (MB+) (has no administration in time range)   azithromycin (ZITHROMAX) 500 mg in dextrose 5 % (D5W) 250 mL IVPB (Vial-Mate) (0 mg Intravenous Stopped 8/24/23 2314)   arformoteroL nebulizer solution 15 mcg (15 mcg Nebulization Given 8/25/23 0717)   sodium chloride 0.9% flush 10 mL (has no administration in time range)   albuterol-ipratropium 2.5 mg-0.5 mg/3 mL nebulizer solution 3 mL (3 mLs Nebulization Given 8/24/23 2243)   melatonin tablet 6 mg (has no administration in time range)   ondansetron injection 4 mg (4 mg Intravenous Given 8/24/23 2305)   prochlorperazine injection Soln 5 mg (has no administration in time range)   polyethylene glycol packet 17 g (has no administration in time range)   simethicone chewable tablet 80 mg (has no administration in time range)   aluminum-magnesium hydroxide-simethicone 200-200-20 mg/5 mL suspension 30 mL (30 mLs Oral Given 8/25/23 0709)   acetaminophen tablet 650 mg (has no administration in time range)   HYDROcodone-acetaminophen 5-325 mg per tablet 1 tablet (has no administration in time range)   naloxone 0.4 mg/mL injection 0.02 mg (has no administration in time range)   potassium bicarbonate disintegrating tablet 50 mEq (has no administration in time range)   potassium bicarbonate disintegrating tablet 35 mEq (has no administration in time range)   potassium bicarbonate disintegrating tablet 60 mEq (has no administration in time range)   magnesium oxide tablet 800 mg (has no administration in time range)   magnesium oxide tablet 800 mg (has no administration in time range)   potassium, sodium phosphates 280-160-250 mg packet 2 packet (has no administration in time range)   potassium, sodium phosphates 280-160-250 mg  packet 2 packet (has no administration in time range)   potassium, sodium phosphates 280-160-250 mg packet 2 packet (has no administration in time range)   glucose chewable tablet 16 g (has no administration in time range)   glucose chewable tablet 24 g (has no administration in time range)   glucagon (human recombinant) injection 1 mg (has no administration in time range)   enoxaparin injection 40 mg (40 mg Subcutaneous Given 8/24/23 2212)   dextrose 10% bolus 125 mL 125 mL (has no administration in time range)   dextrose 10% bolus 250 mL 250 mL (has no administration in time range)   clotrimazole-betamethasone 1-0.05% cream ( Topical (Top) Given 8/24/23 2224)   neomycin-polymyxin-hydrocortisone otic solution 3 drop (3 drops Both Ears Given 8/25/23 0539)   0.9%  NaCl infusion ( Intravenous New Bag 8/24/23 2307)   sodium chloride 0.9% bolus 1,000 mL 1,000 mL (0 mLs Intravenous Stopped 8/24/23 1708)   levalbuterol nebulizer solution 0.63 mg (0.63 mg Nebulization Given 8/24/23 1525)   sodium chloride 0.9% bolus 1,000 mL 1,000 mL (0 mLs Intravenous Stopped 8/24/23 1849)   magnesium sulfate 2g in water 50mL IVPB (premix) (0 g Intravenous Stopped 8/24/23 2005)   cefTRIAXone (ROCEPHIN) 1 g in dextrose 5 % in water (D5W) 100 mL IVPB (MB+) (0 g Intravenous Stopped 8/24/23 1919)   iohexoL (OMNIPAQUE 350) injection 100 mL (100 mLs Intravenous Given 8/24/23 1826)         Medical Decision Making   Medical Decision Making  SOB for 10 - 14 cough.   No fever.  Hx of vaping.    Differential diagnosis includes: Pneumonia, pneumothorax, bronchospasm    Patient had been given 8 mg IM from urgent care.  Patient received 3 Xopenex treatments.  Patient's peak flow pre was 150.  Patient post peak flow was 150.  Patient's sats between 91 and 92%.  Patient is still wheezing despite nebulizer treatment.  Decision was made to hospitalized patient.  Case discussed with hospital medicine.    Amount and/or Complexity of Data Reviewed  External  "Data Reviewed: notes.     Details: See HPI  Labs: ordered. Decision-making details documented in ED Course.     Details: White cell count mildly elevated at 14.56, hemoglobin 18.3 potassium 4.5, lactic acid 1.0, pro count 0.02, COVID, influenza negative.  ABG 7.394  Radiology: ordered and independent interpretation performed. Decision-making details documented in ED Course.  ECG/medicine tests: ordered and independent interpretation performed. Decision-making details documented in ED Course.    Risk  Prescription drug management.  Decision regarding hospitalization.          Discussed case with:Hospital Medicine            MIPS Measures     Smoker? Yes     Hypertension: Pre-hypertension/Hypertension: The pt has been informed that they may have pre-hypertension or hypertension based on a blood pressure reading in the ED. I recommend that the pt call the PCP listed on their discharge instructions or a physician of their choice this week to arrange f/u for further evaluation of possible pre-hypertension or hypertension.       Portions of this note may have been created with voice recognition software. Occasional "wrong-word" or "sound-a-like" substitutions may have occurred due to the inherent limitations of voice recognition software. Please, read the note carefully and recognize, using context, where substitutions have occurred.            Clinical Impression       ICD-10-CM ICD-9-CM   1. Hypoxia  R09.02 799.02   2. Dyspnea  R06.00 786.09   3. Tachycardia  R00.0 785.0   4. Wheezing  R06.2 786.07   5. Rash, bilateral ears.  R21 782.1   6. Chest pain  R07.9 786.50         ED Disposition       Disposition: Admit to Observation, med/surg.  Patient condition: Stable               Chelsea Bradford, DO  08/24/23 1828       Chelsea Bradford, DO  08/25/23 0804    "

## 2023-08-25 ENCOUNTER — CLINICAL SUPPORT (OUTPATIENT)
Dept: SMOKING CESSATION | Facility: CLINIC | Age: 18
End: 2023-08-25
Payer: COMMERCIAL

## 2023-08-25 VITALS
SYSTOLIC BLOOD PRESSURE: 118 MMHG | HEIGHT: 68 IN | OXYGEN SATURATION: 88 % | WEIGHT: 116.88 LBS | BODY MASS INDEX: 17.72 KG/M2 | DIASTOLIC BLOOD PRESSURE: 67 MMHG | HEART RATE: 117 BPM | RESPIRATION RATE: 18 BRPM | TEMPERATURE: 98 F

## 2023-08-25 DIAGNOSIS — F17.200 NICOTINE DEPENDENCE: Primary | ICD-10-CM

## 2023-08-25 PROBLEM — R09.02 HYPOXIA: Status: ACTIVE | Noted: 2023-08-25

## 2023-08-25 PROBLEM — Z72.0 VAPES NICOTINE CONTAINING SUBSTANCE: Status: ACTIVE | Noted: 2023-08-25

## 2023-08-25 LAB
ALBUMIN SERPL BCP-MCNC: 4.1 G/DL (ref 3.5–5.2)
ALP SERPL-CCNC: 57 U/L (ref 55–135)
ALT SERPL W/O P-5'-P-CCNC: 11 U/L (ref 10–44)
AMPHET+METHAMPHET UR QL: NEGATIVE
ANION GAP SERPL CALC-SCNC: 12 MMOL/L (ref 8–16)
AST SERPL-CCNC: 17 U/L (ref 10–40)
BARBITURATES UR QL SCN>200 NG/ML: NEGATIVE
BASOPHILS # BLD AUTO: 0.02 K/UL (ref 0–0.2)
BASOPHILS NFR BLD: 0.2 % (ref 0–1.9)
BENZODIAZ UR QL SCN>200 NG/ML: NEGATIVE
BILIRUB SERPL-MCNC: 0.4 MG/DL (ref 0.1–1)
BUN SERPL-MCNC: 8 MG/DL (ref 6–20)
BZE UR QL SCN: NEGATIVE
CALCIUM SERPL-MCNC: 9.7 MG/DL (ref 8.7–10.5)
CANNABINOIDS UR QL SCN: ABNORMAL
CHLORIDE SERPL-SCNC: 105 MMOL/L (ref 95–110)
CO2 SERPL-SCNC: 22 MMOL/L (ref 23–29)
CREAT SERPL-MCNC: 0.9 MG/DL (ref 0.5–1.4)
CREAT UR-MCNC: 115.4 MG/DL (ref 23–375)
DIFFERENTIAL METHOD: ABNORMAL
EOSINOPHIL # BLD AUTO: 0 K/UL (ref 0–0.5)
EOSINOPHIL NFR BLD: 0.1 % (ref 0–8)
ERYTHROCYTE [DISTWIDTH] IN BLOOD BY AUTOMATED COUNT: 11.9 % (ref 11.5–14.5)
EST. GFR  (NO RACE VARIABLE): >60 ML/MIN/1.73 M^2
GLUCOSE SERPL-MCNC: 136 MG/DL (ref 70–110)
HCT VFR BLD AUTO: 46 % (ref 40–54)
HGB BLD-MCNC: 16.1 G/DL (ref 14–18)
IMM GRANULOCYTES # BLD AUTO: 0.03 K/UL (ref 0–0.04)
IMM GRANULOCYTES NFR BLD AUTO: 0.3 % (ref 0–0.5)
INFLUENZA A, MOLECULAR: NEGATIVE
INFLUENZA B, MOLECULAR: NEGATIVE
LYMPHOCYTES # BLD AUTO: 1.1 K/UL (ref 1–4.8)
LYMPHOCYTES NFR BLD: 10.3 % (ref 18–48)
MCH RBC QN AUTO: 31.8 PG (ref 27–31)
MCHC RBC AUTO-ENTMCNC: 35 G/DL (ref 32–36)
MCV RBC AUTO: 91 FL (ref 82–98)
METHADONE UR QL SCN>300 NG/ML: NEGATIVE
MONOCYTES # BLD AUTO: 0.2 K/UL (ref 0.3–1)
MONOCYTES NFR BLD: 1.6 % (ref 4–15)
NEUTROPHILS # BLD AUTO: 9.3 K/UL (ref 1.8–7.7)
NEUTROPHILS NFR BLD: 87.5 % (ref 38–73)
NRBC BLD-RTO: 0 /100 WBC
OPIATES UR QL SCN: ABNORMAL
PCP UR QL SCN>25 NG/ML: NEGATIVE
PLATELET # BLD AUTO: 357 K/UL (ref 150–450)
PMV BLD AUTO: 10.2 FL (ref 9.2–12.9)
POTASSIUM SERPL-SCNC: 4.5 MMOL/L (ref 3.5–5.1)
PROT SERPL-MCNC: 7.3 G/DL (ref 6–8.4)
RBC # BLD AUTO: 5.07 M/UL (ref 4.6–6.2)
SODIUM SERPL-SCNC: 139 MMOL/L (ref 136–145)
SPECIMEN SOURCE: NORMAL
TOXICOLOGY INFORMATION: ABNORMAL
WBC # BLD AUTO: 10.66 K/UL (ref 3.9–12.7)

## 2023-08-25 PROCEDURE — 87581 M.PNEUMON DNA AMP PROBE: CPT | Performed by: NURSE PRACTITIONER

## 2023-08-25 PROCEDURE — 99406 BEHAV CHNG SMOKING 3-10 MIN: CPT | Mod: S$GLB,,,

## 2023-08-25 PROCEDURE — 87502 INFLUENZA DNA AMP PROBE: CPT | Mod: 59 | Performed by: INTERNAL MEDICINE

## 2023-08-25 PROCEDURE — 94640 AIRWAY INHALATION TREATMENT: CPT

## 2023-08-25 PROCEDURE — 36415 COLL VENOUS BLD VENIPUNCTURE: CPT | Performed by: NURSE PRACTITIONER

## 2023-08-25 PROCEDURE — 85025 COMPLETE CBC W/AUTO DIFF WBC: CPT | Performed by: NURSE PRACTITIONER

## 2023-08-25 PROCEDURE — 25000242 PHARM REV CODE 250 ALT 637 W/ HCPCS: Performed by: NURSE PRACTITIONER

## 2023-08-25 PROCEDURE — 96361 HYDRATE IV INFUSION ADD-ON: CPT

## 2023-08-25 PROCEDURE — 96366 THER/PROPH/DIAG IV INF ADDON: CPT

## 2023-08-25 PROCEDURE — 94640 AIRWAY INHALATION TREATMENT: CPT | Mod: XB

## 2023-08-25 PROCEDURE — 96372 THER/PROPH/DIAG INJ SC/IM: CPT | Performed by: NURSE PRACTITIONER

## 2023-08-25 PROCEDURE — 25000242 PHARM REV CODE 250 ALT 637 W/ HCPCS: Performed by: INTERNAL MEDICINE

## 2023-08-25 PROCEDURE — 96376 TX/PRO/DX INJ SAME DRUG ADON: CPT

## 2023-08-25 PROCEDURE — 63600175 PHARM REV CODE 636 W HCPCS: Performed by: NURSE PRACTITIONER

## 2023-08-25 PROCEDURE — 63600175 PHARM REV CODE 636 W HCPCS: Performed by: INTERNAL MEDICINE

## 2023-08-25 PROCEDURE — 99900035 HC TECH TIME PER 15 MIN (STAT)

## 2023-08-25 PROCEDURE — 99406 PT REFUSED TOBACCO CESSATION: ICD-10-PCS | Mod: S$GLB,,,

## 2023-08-25 PROCEDURE — 25000003 PHARM REV CODE 250: Performed by: NURSE PRACTITIONER

## 2023-08-25 PROCEDURE — 25000003 PHARM REV CODE 250: Performed by: INTERNAL MEDICINE

## 2023-08-25 PROCEDURE — 94761 N-INVAS EAR/PLS OXIMETRY MLT: CPT

## 2023-08-25 PROCEDURE — 80307 DRUG TEST PRSMV CHEM ANLYZR: CPT | Performed by: NURSE PRACTITIONER

## 2023-08-25 PROCEDURE — 80053 COMPREHEN METABOLIC PANEL: CPT | Performed by: NURSE PRACTITIONER

## 2023-08-25 PROCEDURE — G0378 HOSPITAL OBSERVATION PER HR: HCPCS

## 2023-08-25 PROCEDURE — 27000221 HC OXYGEN, UP TO 24 HOURS

## 2023-08-25 RX ORDER — BENZONATATE 100 MG/1
100 CAPSULE ORAL 3 TIMES DAILY PRN
Status: DISCONTINUED | OUTPATIENT
Start: 2023-08-25 | End: 2023-08-26 | Stop reason: HOSPADM

## 2023-08-25 RX ORDER — FAMOTIDINE 20 MG/1
20 TABLET, FILM COATED ORAL 2 TIMES DAILY
Status: DISCONTINUED | OUTPATIENT
Start: 2023-08-25 | End: 2023-08-26 | Stop reason: HOSPADM

## 2023-08-25 RX ORDER — IPRATROPIUM BROMIDE AND ALBUTEROL SULFATE 2.5; .5 MG/3ML; MG/3ML
3 SOLUTION RESPIRATORY (INHALATION) EVERY 6 HOURS
Status: DISCONTINUED | OUTPATIENT
Start: 2023-08-25 | End: 2023-08-26 | Stop reason: HOSPADM

## 2023-08-25 RX ADMIN — LEVALBUTEROL HYDROCHLORIDE 1.25 MG: 0.63 SOLUTION RESPIRATORY (INHALATION) at 01:08

## 2023-08-25 RX ADMIN — METHYLPREDNISOLONE SODIUM SUCCINATE 60 MG: 40 INJECTION, POWDER, FOR SOLUTION INTRAMUSCULAR; INTRAVENOUS at 05:08

## 2023-08-25 RX ADMIN — BUDESONIDE 0.5 MG: 0.5 INHALANT ORAL at 07:08

## 2023-08-25 RX ADMIN — ARFORMOTEROL TARTRATE 15 MCG: 15 SOLUTION RESPIRATORY (INHALATION) at 08:08

## 2023-08-25 RX ADMIN — NEOMYCIN SULFATE, POLYMYXIN B SULFATE, HYDROCORTISONE 3 DROP: 3.5; 10000; 1 SOLUTION/ DROPS AURICULAR (OTIC) at 05:08

## 2023-08-25 RX ADMIN — ALUMINUM HYDROXIDE, MAGNESIUM HYDROXIDE, AND DIMETHICONE 30 ML: 200; 20; 200 SUSPENSION ORAL at 07:08

## 2023-08-25 RX ADMIN — ENOXAPARIN SODIUM 40 MG: 40 INJECTION SUBCUTANEOUS at 04:08

## 2023-08-25 RX ADMIN — ARFORMOTEROL TARTRATE 15 MCG: 15 SOLUTION RESPIRATORY (INHALATION) at 07:08

## 2023-08-25 RX ADMIN — ONDANSETRON 4 MG: 2 INJECTION INTRAMUSCULAR; INTRAVENOUS at 10:08

## 2023-08-25 RX ADMIN — IPRATROPIUM BROMIDE AND ALBUTEROL SULFATE 3 ML: .5; 3 SOLUTION RESPIRATORY (INHALATION) at 08:08

## 2023-08-25 RX ADMIN — LEVALBUTEROL HYDROCHLORIDE 1.25 MG: 0.63 SOLUTION RESPIRATORY (INHALATION) at 07:08

## 2023-08-25 RX ADMIN — NEOMYCIN SULFATE, POLYMYXIN B SULFATE, HYDROCORTISONE 3 DROP: 3.5; 10000; 1 SOLUTION/ DROPS AURICULAR (OTIC) at 12:08

## 2023-08-25 RX ADMIN — AZITHROMYCIN MONOHYDRATE 500 MG: 500 INJECTION, POWDER, LYOPHILIZED, FOR SOLUTION INTRAVENOUS at 09:08

## 2023-08-25 RX ADMIN — BUDESONIDE 0.5 MG: 0.5 INHALANT ORAL at 08:08

## 2023-08-25 RX ADMIN — METHYLPREDNISOLONE SODIUM SUCCINATE 40 MG: 40 INJECTION, POWDER, FOR SOLUTION INTRAMUSCULAR; INTRAVENOUS at 06:08

## 2023-08-25 RX ADMIN — CLOTRIMAZOLE AND BETAMETHASONE DIPROPIONATE: 10; .64 CREAM TOPICAL at 08:08

## 2023-08-25 RX ADMIN — CEFTRIAXONE SODIUM 1 G: 1 INJECTION, POWDER, FOR SOLUTION INTRAMUSCULAR; INTRAVENOUS at 06:08

## 2023-08-25 RX ADMIN — CLOTRIMAZOLE AND BETAMETHASONE DIPROPIONATE: 10; .64 CREAM TOPICAL at 09:08

## 2023-08-25 RX ADMIN — BENZONATATE 100 MG: 100 CAPSULE ORAL at 12:08

## 2023-08-25 RX ADMIN — FAMOTIDINE 20 MG: 20 TABLET, FILM COATED ORAL at 04:08

## 2023-08-25 RX ADMIN — HYDROCODONE BITARTRATE AND ACETAMINOPHEN 1 TABLET: 5; 325 TABLET ORAL at 11:08

## 2023-08-25 RX ADMIN — NEOMYCIN SULFATE, POLYMYXIN B SULFATE, HYDROCORTISONE 3 DROP: 3.5; 10000; 1 SOLUTION/ DROPS AURICULAR (OTIC) at 06:08

## 2023-08-25 RX ADMIN — SODIUM CHLORIDE: 9 INJECTION, SOLUTION INTRAVENOUS at 02:08

## 2023-08-25 RX ADMIN — BENZONATATE 100 MG: 100 CAPSULE ORAL at 09:08

## 2023-08-25 NOTE — PLAN OF CARE
Patient is in stable condition, no acute distress, remained free from injuries, receiving IV fluids, receiving IV antibiotics, no pain reported this shift, on cardiac monitoring (ST), 2 L NC, receiving breathing treatments through respiratory therapy, VSS and all active orders reviewed. 24 hr chart check performed.

## 2023-08-25 NOTE — SUBJECTIVE & OBJECTIVE
Past Medical History:   Diagnosis Date    Vapes nicotine containing substance        Past Surgical History:   Procedure Laterality Date    INCISION AND DRAINAGE OF ABSCESS      scalp       Review of patient's allergies indicates:  No Known Allergies    Family History       Problem Relation (Age of Onset)    No Known Problems Mother    Ulcerative colitis Father          Tobacco Use    Smoking status: Every Day     Types: Vaping with nicotine    Smokeless tobacco: Never   Substance and Sexual Activity    Alcohol use: No    Drug use: Yes     Frequency: 1.0 times per week     Types: Marijuana    Sexual activity: Not on file         Review of Systems   HENT:  Positive for congestion, ear discharge and ear pain.    Respiratory:  Positive for cough, shortness of breath and wheezing. Negative for chest tightness and stridor.    Cardiovascular:  Negative for chest pain and leg swelling.   Gastrointestinal:  Negative for abdominal pain, nausea and vomiting.     Objective:     Vital Signs (Most Recent):  Temp: 97.7 °F (36.5 °C) (08/25/23 0754)  Pulse: 91 (08/25/23 0754)  Resp: 18 (08/25/23 0754)  BP: 122/68 (08/25/23 0754)  SpO2: (!) 94 % (08/25/23 0754) Vital Signs (24h Range):  Temp:  [97.6 °F (36.4 °C)-97.9 °F (36.6 °C)] 97.7 °F (36.5 °C)  Pulse:  [] 91  Resp:  [10-29] 18  SpO2:  [91 %-100 %] 94 %  BP: (122-148)/() 122/68     Weight: 53 kg (116 lb 13.5 oz)  Body mass index is 17.77 kg/m².      Intake/Output Summary (Last 24 hours) at 8/25/2023 1133  Last data filed at 8/25/2023 0811  Gross per 24 hour   Intake 2385.06 ml   Output 1000 ml   Net 1385.06 ml        Physical Exam  Vitals reviewed.   Constitutional:       General: He is awake.      Appearance: Normal appearance.   Cardiovascular:      Pulses:           Radial pulses are 2+ on the right side and 2+ on the left side.   Pulmonary:      Effort: Pulmonary effort is normal.      Breath sounds: Wheezing present.      Comments: On RA  Abdominal:      General:  There is no distension.      Palpations: Abdomen is soft.   Musculoskeletal:      Right lower leg: No edema.      Left lower leg: No edema.   Skin:     General: Skin is warm and dry.   Neurological:      General: No focal deficit present.      Mental Status: He is alert.   Psychiatric:         Behavior: Behavior is cooperative.              Lines/Drains/Airways       Peripheral Intravenous Line  Duration                  Peripheral IV - Single Lumen 08/24/23 1537 20 G Right Antecubital <1 day                    Significant Labs:    CBC/Anemia Profile:  Recent Labs   Lab 08/24/23  1538 08/25/23  0307   WBC 14.56* 10.66   HGB 18.3* 16.1   HCT 51.2 46.0    357   MCV 90 91   RDW 11.9 11.9        Chemistries:  Recent Labs   Lab 08/24/23  1538 08/25/23  0307    139   K 4.5 4.5    105   CO2 20* 22*   BUN 7 8   CREATININE 0.8 0.9   CALCIUM 10.1 9.7   ALBUMIN 4.7 4.1   PROT 8.3 7.3   BILITOT 0.7 0.4   ALKPHOS 61 57   ALT 11 11   AST 19 17       All pertinent labs within the past 24 hours have been reviewed.    Significant Imaging:   I have reviewed all pertinent imaging results/findings within the past 24 hours.

## 2023-08-25 NOTE — SUBJECTIVE & OBJECTIVE
Past Medical History:   Diagnosis Date    Vapes nicotine containing substance        Past Surgical History:   Procedure Laterality Date    INCISION AND DRAINAGE OF ABSCESS      scalp       Review of patient's allergies indicates:  No Known Allergies    No current facility-administered medications on file prior to encounter.     Current Outpatient Medications on File Prior to Encounter   Medication Sig    ibuprofen (ADVIL,MOTRIN) 600 MG tablet Take 1 tablet (600 mg total) by mouth every 6 (six) hours as needed for Pain.    mupirocin (BACTROBAN) 2 % ointment Apply topically 3 (three) times daily.    sulfamethoxazole-trimethoprim 400-80mg (BACTRIM,SEPTRA) 400-80 mg per tablet Take 1 tablet by mouth 2 (two) times daily.     Family History       Problem Relation (Age of Onset)    No Known Problems Mother    Ulcerative colitis Father          Tobacco Use    Smoking status: Every Day     Current packs/day: 0.00     Types: Vaping with nicotine    Smokeless tobacco: Never   Substance and Sexual Activity    Alcohol use: No    Drug use: Yes     Frequency: 1.0 times per week     Types: Marijuana    Sexual activity: Not on file     Review of Systems   Constitutional:  Positive for activity change. Negative for appetite change, chills, diaphoresis, fatigue and fever.   HENT:  Positive for congestion and ear discharge. Negative for nosebleeds, sore throat and trouble swallowing.    Eyes:  Negative for pain, discharge and visual disturbance.   Respiratory:  Positive for cough, shortness of breath and wheezing. Negative for apnea, chest tightness and stridor.    Cardiovascular:  Negative for chest pain, palpitations and leg swelling.   Gastrointestinal:  Negative for abdominal distention, abdominal pain, blood in stool, constipation, diarrhea, nausea and vomiting.   Endocrine: Negative for cold intolerance and heat intolerance.   Genitourinary:  Negative for difficulty urinating, dysuria, flank pain, frequency and urgency.    Musculoskeletal:  Negative for arthralgias, back pain, joint swelling, myalgias, neck pain and neck stiffness.   Skin:  Negative for rash and wound.   Allergic/Immunologic: Negative for food allergies and immunocompromised state.   Neurological:  Negative for dizziness, seizures, syncope, facial asymmetry, weakness, light-headedness and headaches.   Hematological:  Negative for adenopathy.   Psychiatric/Behavioral:  Negative for agitation, behavioral problems and confusion. The patient is not nervous/anxious.      Objective:     Vital Signs (Most Recent):  Temp: 97.6 °F (36.4 °C) (08/24/23 2116)  Pulse: 89 (08/24/23 2116)  Resp: 19 (08/24/23 2116)  BP: 129/82 (08/24/23 2116)  SpO2: (!) 93 % (08/24/23 2116) Vital Signs (24h Range):  Temp:  [97.6 °F (36.4 °C)-97.7 °F (36.5 °C)] 97.6 °F (36.4 °C)  Pulse:  [] 89  Resp:  [10-29] 19  SpO2:  [91 %-100 %] 93 %  BP: (122-148)/() 129/82     Weight: 53 kg (116 lb 13.5 oz)  Body mass index is 17.77 kg/m².     Physical Exam  Vitals and nursing note reviewed.   Constitutional:       Appearance: He is well-developed.   HENT:      Head: Normocephalic and atraumatic.      Ears:      Comments: Both ear canals with erythema, crusty yellow drainage R>L  See pictures       Nose: Nose normal.   Eyes:      General: No scleral icterus.  Cardiovascular:      Rate and Rhythm: Normal rate and regular rhythm.      Heart sounds: Normal heart sounds. No murmur heard.     No friction rub. No gallop.   Pulmonary:      Effort: Pulmonary effort is normal. No respiratory distress.      Breath sounds: Wheezing (bilaterally) and rhonchi (bilaterally) present.      Comments: +mild conversational dyspnea   Abdominal:      General: Bowel sounds are normal. There is no distension.      Palpations: Abdomen is soft.      Tenderness: There is no abdominal tenderness.   Musculoskeletal:         General: Normal range of motion.      Cervical back: Normal range of motion and neck supple.   Skin:      General: Skin is warm and dry.      Findings: No rash.   Neurological:      Mental Status: He is alert and oriented to person, place, and time.      Cranial Nerves: No cranial nerve deficit.   Psychiatric:         Behavior: Behavior normal.                      Significant Labs: All pertinent labs within the past 24 hours have been reviewed.    Significant Imaging: I have reviewed all pertinent imaging results/findings within the past 24 hours.

## 2023-08-25 NOTE — ASSESSMENT & PLAN NOTE
TMs were reported as intact bilaterally   Cortisporin otic drops TID and Clotrimazole cream BID  Consider Acetic acid drops on discharge

## 2023-08-25 NOTE — H&P
[facility]  [facility] Medicine  History & Physical    Patient Name: Rohan Chin  MRN: [id number]  Patient Class: OP- Observation  Admission Date: 8/24/2023  Attending Physician: Dr. Mcfarland  Primary Care Provider: Diane, Primary Doctor         Patient information was obtained from patient and ER records.     Subjective:     Principal Problem:Bilateral pneumonia    Chief Complaint:   Chief Complaint   Patient presents with    Nasal Congestion     Nasal congestion, cough , onset 2 weeks ago, sent over from urgent care         HPI: The patient is a 16 yo male Smoker (vape use) male who sent  to [facility] ED by PCP for Hypoxia. The patient reports nasal and chest congestion, cough, and SOB- onset 2 weeks ago that gradually worsened. He went to [facility] PCP today and O2 sats were 88%. He was given an IM injection of dexamethasone 8 mg and sent to ED for evaluation. Pt denies fever/chills. Denies hx of Asthma. He notes that he has chronic problems with both with crusting and drainage for years. He noted his right ear flared up a few days ago.     In the ED, O2sats 91%, tachycardia and tachypnea noted, Afebrile. Labs revealed WBC 14, Mild anion gap acidosis, LA and procalcitonin normal. ABG showed normal PH, Pao2 68, pCo2 29. FLU/COVID negative. CXR showed nothing acute.   CTA chest showed no PE, Small volume bilateral right greater than left subsolid opacities concerning for PNA.     The patient was transferred to [facility] for observation under hospital medicine for PNA, Acute bronchitis, and hypoxemia.       Past Medical History:   Diagnosis Date    Vapes nicotine containing substance        Past Surgical History:   Procedure Laterality Date    INCISION AND DRAINAGE OF ABSCESS      scalp       Review of patient's allergies indicates:  No Known Allergies    No current facility-administered medications on file prior to encounter.     Current Outpatient Medications on File Prior to Encounter   Medication Sig    ibuprofen  (ADVIL,MOTRIN) 600 MG tablet Take 1 tablet (600 mg total) by mouth every 6 (six) hours as needed for Pain.    mupirocin (BACTROBAN) 2 % ointment Apply topically 3 (three) times daily.    sulfamethoxazole-trimethoprim 400-80mg (BACTRIM,SEPTRA) 400-80 mg per tablet Take 1 tablet by mouth 2 (two) times daily.     Family History       Problem Relation (Age of Onset)    No Known Problems Mother    Ulcerative colitis Father          Tobacco Use    Smoking status: Every Day     Current packs/day: 0.00     Types: Vaping with nicotine    Smokeless tobacco: Never   Substance and Sexual Activity    Alcohol use: No    Drug use: Yes     Frequency: 1.0 times per week     Types: Marijuana    Sexual activity: Not on file     Review of Systems   Constitutional:  Positive for activity change. Negative for appetite change, chills, diaphoresis, fatigue and fever.   HENT:  Positive for congestion and ear discharge. Negative for nosebleeds, sore throat and trouble swallowing.    Eyes:  Negative for pain, discharge and visual disturbance.   Respiratory:  Positive for cough, shortness of breath and wheezing. Negative for apnea, chest tightness and stridor.    Cardiovascular:  Negative for chest pain, palpitations and leg swelling.   Gastrointestinal:  Negative for abdominal distention, abdominal pain, blood in stool, constipation, diarrhea, nausea and vomiting.   Endocrine: Negative for cold intolerance and heat intolerance.   Genitourinary:  Negative for difficulty urinating, dysuria, flank pain, frequency and urgency.   Musculoskeletal:  Negative for arthralgias, back pain, joint swelling, myalgias, neck pain and neck stiffness.   Skin:  Negative for rash and wound.   Allergic/Immunologic: Negative for food allergies and immunocompromised state.   Neurological:  Negative for dizziness, seizures, syncope, facial asymmetry, weakness, light-headedness and headaches.   Hematological:  Negative for adenopathy.   Psychiatric/Behavioral:   Negative for agitation, behavioral problems and confusion. The patient is not nervous/anxious.      Objective:     Vital Signs (Most Recent):  Temp: 97.6 °F (36.4 °C) (08/24/23 2116)  Pulse: 89 (08/24/23 2116)  Resp: 19 (08/24/23 2116)  BP: 129/82 (08/24/23 2116)  SpO2: (!) 93 % (08/24/23 2116) Vital Signs (24h Range):  Temp:  [97.6 °F (36.4 °C)-97.7 °F (36.5 °C)] 97.6 °F (36.4 °C)  Pulse:  [] 89  Resp:  [10-29] 19  SpO2:  [91 %-100 %] 93 %  BP: (122-148)/() 129/82     Weight: 53 kg (116 lb 13.5 oz)  Body mass index is 17.77 kg/m².     Physical Exam  Vitals and nursing note reviewed.   Constitutional:       Appearance: He is well-developed.   HENT:      Head: Normocephalic and atraumatic.      Ears:      Comments: Both ear canals with erythema, crusty yellow drainage R>L  See pictures       Nose: Nose normal.   Eyes:      General: No scleral icterus.  Cardiovascular:      Rate and Rhythm: Normal rate and regular rhythm.      Heart sounds: Normal heart sounds. No murmur heard.     No friction rub. No gallop.   Pulmonary:      Effort: Pulmonary effort is normal. No respiratory distress.      Breath sounds: Wheezing (bilaterally) and rhonchi (bilaterally) present.      Comments: +mild conversational dyspnea   Abdominal:      General: Bowel sounds are normal. There is no distension.      Palpations: Abdomen is soft.      Tenderness: There is no abdominal tenderness.   Musculoskeletal:         General: Normal range of motion.      Cervical back: Normal range of motion and neck supple.   Skin:     General: Skin is warm and dry.      Findings: No rash.   Neurological:      Mental Status: He is alert and oriented to person, place, and time.      Cranial Nerves: No cranial nerve deficit.   Psychiatric:         Behavior: Behavior normal.                      Significant Labs: All pertinent labs within the past 24 hours have been reviewed.    Significant Imaging: I have reviewed all pertinent imaging  results/findings within the past 24 hours.    Assessment/Plan:     * Bilateral pneumonia  IV Rocephin and Azithromycin   Blood and sputum cultures       Acute hypoxemic respiratory failure  Patient with Hypoxic Respiratory failure which is Acute.  he is not on home oxygen. Supplemental oxygen was provided and noted-      .   Signs/symptoms of respiratory failure include- tachypnea and use of accessory muscles. Contributing diagnoses includes - Pneumonia Labs and images were reviewed. Patient Has recent ABG, which has been reviewed. Will treat underlying causes and adjust management of respiratory failure       Acute bronchitis  IV Steroids  HELADIO, LABA, ICS neb txs       Otitis externa  TMs were reported as intact bilaterally   Cortisporin otic drops TID and Clotrimazole cream BID  Consider Acetic acid drops on discharge         VTE Risk Mitigation (From admission, onward)         Ordered     enoxaparin injection 40 mg  Daily         08/24/23 2129     IP VTE LOW RISK PATIENT  Once         08/24/23 2129     Place sequential compression device  Until discontinued         08/24/23 2129                     On 08/24/2023, patient should be placed in hospital observation services under my care in collaboration with Dr. Bartolo Morris, NP  Department of Hospital Medicine  [facility]

## 2023-08-25 NOTE — CONSULTS
[facility]  Pulmonology  Consult Note    Patient Name: Rohan Chin  MRN: [id number]  Admission Date: 8/24/2023  Hospital Length of Stay: 0 days  Code Status: Full Code  Attending Physician: Roz Calderón MD  Primary Care Provider: No, Primary Doctor   Principal Problem: Hypoxia    Subjective:     HPI:  17 year old male with a known past medical history of regular vape use and cigarette use (no pulmonary history such as asthma reported or need for nebs / inhalers) that presented to the [facility] ER 8/24 for hypoxia associated with 2 weeks of nasal / chest congestion, cough, and some SOB. Pt went to [facility] 8/24 where we was found to have sat of 88%; he was given 8mg decadron IM and sent to the [facility] ER. Workup in the ER revealed sat of 91%, tachycardia, tachypnea, WBC 14K. CXR unremarkable. CTA Chest without PE but with some small amounts of bilateral haziness. COVID / flu negative. Pt transferred to [facility] for admission under . Pulmonary consulted 8/25 for eval and further recs.     Interval History:  8/25: pulm consulted for eval and recs      Past Medical History:   Diagnosis Date    Vapes nicotine containing substance        Past Surgical History:   Procedure Laterality Date    INCISION AND DRAINAGE OF ABSCESS      scalp       Review of patient's allergies indicates:  No Known Allergies    Family History       Problem Relation (Age of Onset)    No Known Problems Mother    Ulcerative colitis Father          Tobacco Use    Smoking status: Every Day     Types: Vaping with nicotine    Smokeless tobacco: Never   Substance and Sexual Activity    Alcohol use: No    Drug use: Yes     Frequency: 1.0 times per week     Types: Marijuana    Sexual activity: Not on file         Review of Systems   HENT:  Positive for congestion, ear discharge and ear pain.    Respiratory:  Positive for cough, shortness of breath and wheezing. Negative for chest tightness and stridor.    Cardiovascular:  Negative for  chest pain and leg swelling.   Gastrointestinal:  Negative for abdominal pain, nausea and vomiting.     Objective:     Vital Signs (Most Recent):  Temp: 97.7 °F (36.5 °C) (08/25/23 0754)  Pulse: 91 (08/25/23 0754)  Resp: 18 (08/25/23 0754)  BP: 122/68 (08/25/23 0754)  SpO2: (!) 94 % (08/25/23 0754) Vital Signs (24h Range):  Temp:  [97.6 °F (36.4 °C)-97.9 °F (36.6 °C)] 97.7 °F (36.5 °C)  Pulse:  [] 91  Resp:  [10-29] 18  SpO2:  [91 %-100 %] 94 %  BP: (122-148)/() 122/68     Weight: 53 kg (116 lb 13.5 oz)  Body mass index is 17.77 kg/m².      Intake/Output Summary (Last 24 hours) at 8/25/2023 1133  Last data filed at 8/25/2023 0811  Gross per 24 hour   Intake 2385.06 ml   Output 1000 ml   Net 1385.06 ml        Physical Exam  Vitals reviewed.   Constitutional:       General: He is awake.      Appearance: Normal appearance.   Cardiovascular:      Pulses:           Radial pulses are 2+ on the right side and 2+ on the left side.   Pulmonary:      Effort: Pulmonary effort is normal.      Breath sounds: Wheezing present.      Comments: On RA  Abdominal:      General: There is no distension.      Palpations: Abdomen is soft.   Musculoskeletal:      Right lower leg: No edema.      Left lower leg: No edema.   Skin:     General: Skin is warm and dry.   Neurological:      General: No focal deficit present.      Mental Status: He is alert.   Psychiatric:         Behavior: Behavior is cooperative.              Lines/Drains/Airways       Peripheral Intravenous Line  Duration                  Peripheral IV - Single Lumen 08/24/23 1537 20 G Right Antecubital <1 day                    Significant Labs:    CBC/Anemia Profile:  Recent Labs   Lab 08/24/23  1538 08/25/23  0307   WBC 14.56* 10.66   HGB 18.3* 16.1   HCT 51.2 46.0    357   MCV 90 91   RDW 11.9 11.9        Chemistries:  Recent Labs   Lab 08/24/23  1538 08/25/23  0307    139   K 4.5 4.5    105   CO2 20* 22*   BUN 7 8   CREATININE 0.8 0.9    CALCIUM 10.1 9.7   ALBUMIN 4.7 4.1   PROT 8.3 7.3   BILITOT 0.7 0.4   ALKPHOS 61 57   ALT 11 11   AST 19 17       All pertinent labs within the past 24 hours have been reviewed.    Significant Imaging:   I have reviewed all pertinent imaging results/findings within the past 24 hours.      ABG  Recent Labs   Lab 08/24/23  1813   PH 7.394   PO2 68*   PCO2 29.6*   HCO3 18.1*   BE -7     Assessment/Plan:     ENT  Otitis externa  - per     Pulmonary  * Hypoxia  - pt now on RA with sat of 94%  - continues with diffuse wheezing  - add scheduled nebs, continue steroids  - do not see pneumonia on CT, concern for viral illness  - obtain rapid flu and RVP  - procal negative, afebrile WBC normal; reasonable to continue abx for a 5 day course     Acute bronchitis  - see plan for hypoxia     Other  Vapes nicotine containing substance  - also smokes cigarettes when does not have his vape  - nicotine patch if desired  - extensive cessation education           Thank you for your consult. I will follow-up with patient. Please contact us if you have any additional questions.          Steve Monteiro, NP  Pulmonology  [facility]

## 2023-08-25 NOTE — ASSESSMENT & PLAN NOTE
- also smokes cigarettes when does not have his vape  - nicotine patch if desired  - extensive cessation education

## 2023-08-25 NOTE — ASSESSMENT & PLAN NOTE
- pt now on RA with sat of 94%  - continues with diffuse wheezing  - add scheduled nebs, continue steroids  - do not see pneumonia on CT, concern for viral illness  - obtain rapid flu and RVP  - procal negative, afebrile WBC normal; reasonable to continue abx for a 5 day course

## 2023-08-25 NOTE — PLAN OF CARE
[facility]  Discharge Assessment    Primary Care Provider: No, Primary Doctor     Discharge Assessment (most recent)       BRIEF DISCHARGE ASSESSMENT - 08/25/23 1015          Discharge Planning    Assessment Type Discharge Planning Brief Assessment     Resource/Environmental Concerns none     Support Systems Family members     Assistance Needed Independent     Equipment Currently Used at Home none     Current Living Arrangements home     Patient/Family Anticipates Transition to home with family     Patient/Family Anticipated Services at Transition none     DME Needed Upon Discharge  none     Discharge Plan A Home with family                   Sw met with patient to complete assessment and to discuss d/c planning needs. Patient has no d/c needs at this time. Sw to follow up, as needed, for d/c planning purposes.

## 2023-08-25 NOTE — HPI
The patient is a 18 yo male Smoker (vape use) male who sent  to [facility] ED by PCP for Hypoxia. The patient reports nasal and chest congestion, cough, and SOB- onset 2 weeks ago that gradually worsened. He went to [facility] PCP today and O2 sats were 88%. He was given an IM injection of dexamethasone 8 mg and sent to ED for evaluation. Pt denies fever/chills. Denies hx of Asthma. He notes that he has chronic problems with both with crusting and drainage for years. He noted his right ear flared up a few days ago.     In the ED, O2sats 91%, tachycardia and tachypnea noted, Afebrile. Labs revealed WBC 14, Mild anion gap acidosis, LA and procalcitonin normal. ABG showed normal PH, Pao2 68, pCo2 29. FLU/COVID negative. CXR showed nothing acute.   CTA chest showed no PE, Small volume bilateral right greater than left subsolid opacities concerning for PNA.     The patient was transferred to [facility] for observation under hospital medicine for PNA, Acute bronchitis, and hypoxemia.

## 2023-08-25 NOTE — PROGRESS NOTES
Pharmacist Renal Dose Adjustment Note    Rohan Chin is a 17 y.o. male being treated with the medication famotidine.     Patient Data:    Vital Signs (Most Recent):  Temp: 96.6 °F (35.9 °C) (08/25/23 1204)  Pulse: 102 (08/25/23 1204)  Resp: 18 (08/25/23 1204)  BP: 113/61 (08/25/23 1204)  SpO2: (!) 91 % (08/25/23 1204) Vital Signs (72h Range):  Temp:  [96.6 °F (35.9 °C)-97.9 °F (36.6 °C)]   Pulse:  []   Resp:  [10-29]   BP: (113-148)/()   SpO2:  [91 %-100 %]      Recent Labs   Lab 08/24/23  1538 08/25/23  0307   CREATININE 0.8 0.9     Serum creatinine: 0.9 mg/dL 08/25/23 0307  Estimated creatinine clearance: 99 mL/min    Famotidine 20 mg oral every 24 hours will be changed to famotidine 20 mg oral every 12 hours per renal dose protocol for CrCl > 50 ml/min.     Thank you,  Pharmacist's Name: Bran Hinton

## 2023-08-25 NOTE — HPI
17 year old male with a known past medical history of regular vape use and cigarette use (no pulmonary history such as asthma reported or need for nebs / inhalers) that presented to the [facility] ER 8/24 for hypoxia associated with 2 weeks of nasal / chest congestion, cough, and some SOB. Pt went to [facility] 8/24 where we was found to have sat of 88%; he was given 8mg decadron IM and sent to the [facility] ER. Workup in the ER revealed sat of 91%, tachycardia, tachypnea, WBC 14K. CXR unremarkable. CTA Chest without PE but with some small amounts of bilateral haziness. COVID / flu negative. Pt transferred to [facility] for admission under . Pulmonary consulted 8/25 for eval and further recs.     Interval History:  8/25: pulm consulted for eval and recs

## 2023-08-25 NOTE — SUBJECTIVE & OBJECTIVE
Interval History: patient actively vomited while rounding this morning. Pulm consulted    Review of Systems  Objective:     Vital Signs (Most Recent):  Temp: 97.7 °F (36.5 °C) (08/25/23 1626)  Pulse: (!) 114 (08/25/23 1626)  Resp: 18 (08/25/23 1626)  BP: 125/71 (08/25/23 1626)  SpO2: (!) 92 % (08/25/23 1626) Vital Signs (24h Range):  Temp:  [96.6 °F (35.9 °C)-97.9 °F (36.6 °C)] 97.7 °F (36.5 °C)  Pulse:  [] 114  Resp:  [17-29] 18  SpO2:  [91 %-97 %] 92 %  BP: (113-138)/(61-82) 125/71     Weight: 53 kg (116 lb 13.5 oz)  Body mass index is 17.77 kg/m².    Intake/Output Summary (Last 24 hours) at 8/25/2023 1813  Last data filed at 8/25/2023 0811  Gross per 24 hour   Intake 1385.06 ml   Output 1000 ml   Net 385.06 ml         Physical Exam  Constitutional:       Appearance: He is ill-appearing.   HENT:      Head: Normocephalic and atraumatic.   Cardiovascular:      Rate and Rhythm: Normal rate and regular rhythm.      Heart sounds: No murmur heard.  Pulmonary:      Effort: Pulmonary effort is normal. No respiratory distress.      Breath sounds: Wheezing present.   Abdominal:      General: Bowel sounds are normal. There is no distension.      Palpations: Abdomen is soft.      Tenderness: There is no abdominal tenderness.   Musculoskeletal:         General: No swelling.   Skin:     General: Skin is warm and dry.   Neurological:      Mental Status: He is alert and oriented to person, place, and time. Mental status is at baseline.             Significant Labs: All pertinent labs within the past 24 hours have been reviewed.  CBC:   Recent Labs   Lab 08/24/23  1538 08/25/23  0307   WBC 14.56* 10.66   HGB 18.3* 16.1   HCT 51.2 46.0    357     CMP:   Recent Labs   Lab 08/24/23  1538 08/25/23  0307    139   K 4.5 4.5    105   CO2 20* 22*   GLU 96 136*   BUN 7 8   CREATININE 0.8 0.9   CALCIUM 10.1 9.7   PROT 8.3 7.3   ALBUMIN 4.7 4.1   BILITOT 0.7 0.4   ALKPHOS 61 57   AST 19 17   ALT 11 11   ANIONGAP 17*  12       Significant Imaging: I have reviewed all pertinent imaging results/findings within the past 24 hours.

## 2023-08-25 NOTE — PROGRESS NOTES
[facility] - Med Surg 3  Hospital Medicine  Progress Note    Patient Name: Rohan Chin  MRN: [id number]  Patient Class: OP- Observation   Admission Date: 8/24/2023  Length of Stay: 0 days  Attending Physician: Roz Calderón MD  Primary Care Provider: Diane, Primary Doctor        Subjective:     Principal Problem:Hypoxia        HPI:  The patient is a 18 yo male Smoker (vape use) male who sent  to [facility] ED by PCP for Hypoxia. The patient reports nasal and chest congestion, cough, and SOB- onset 2 weeks ago that gradually worsened. He went to [facility] PCP today and O2 sats were 88%. He was given an IM injection of dexamethasone 8 mg and sent to ED for evaluation. Pt denies fever/chills. Denies hx of Asthma. He notes that he has chronic problems with both with crusting and drainage for years. He noted his right ear flared up a few days ago.     In the ED, O2sats 91%, tachycardia and tachypnea noted, Afebrile. Labs revealed WBC 14, Mild anion gap acidosis, LA and procalcitonin normal. ABG showed normal PH, Pao2 68, pCo2 29. FLU/COVID negative. CXR showed nothing acute.   CTA chest showed no PE, Small volume bilateral right greater than left subsolid opacities concerning for PNA.     The patient was transferred to [facility] for observation under hospital medicine for PNA, Acute bronchitis, and hypoxemia.       Overview/Hospital Course:  No notes on file    Interval History: patient actively vomited while rounding this morning. Pulm consulted    Review of Systems  Objective:     Vital Signs (Most Recent):  Temp: 97.7 °F (36.5 °C) (08/25/23 1626)  Pulse: (!) 114 (08/25/23 1626)  Resp: 18 (08/25/23 1626)  BP: 125/71 (08/25/23 1626)  SpO2: (!) 92 % (08/25/23 1626) Vital Signs (24h Range):  Temp:  [96.6 °F (35.9 °C)-97.9 °F (36.6 °C)] 97.7 °F (36.5 °C)  Pulse:  [] 114  Resp:  [17-29] 18  SpO2:  [91 %-97 %] 92 %  BP: (113-138)/(61-82) 125/71     Weight: 53 kg (116 lb 13.5 oz)  Body mass index is 17.77 kg/m².    Intake/Output  Summary (Last 24 hours) at 8/25/2023 1813  Last data filed at 8/25/2023 0811  Gross per 24 hour   Intake 1385.06 ml   Output 1000 ml   Net 385.06 ml         Physical Exam  Constitutional:       Appearance: He is ill-appearing.   HENT:      Head: Normocephalic and atraumatic.   Cardiovascular:      Rate and Rhythm: Normal rate and regular rhythm.      Heart sounds: No murmur heard.  Pulmonary:      Effort: Pulmonary effort is normal. No respiratory distress.      Breath sounds: Wheezing present.   Abdominal:      General: Bowel sounds are normal. There is no distension.      Palpations: Abdomen is soft.      Tenderness: There is no abdominal tenderness.   Musculoskeletal:         General: No swelling.   Skin:     General: Skin is warm and dry.   Neurological:      Mental Status: He is alert and oriented to person, place, and time. Mental status is at baseline.             Significant Labs: All pertinent labs within the past 24 hours have been reviewed.  CBC:   Recent Labs   Lab 08/24/23  1538 08/25/23  0307   WBC 14.56* 10.66   HGB 18.3* 16.1   HCT 51.2 46.0    357     CMP:   Recent Labs   Lab 08/24/23  1538 08/25/23  0307    139   K 4.5 4.5    105   CO2 20* 22*   GLU 96 136*   BUN 7 8   CREATININE 0.8 0.9   CALCIUM 10.1 9.7   PROT 8.3 7.3   ALBUMIN 4.7 4.1   BILITOT 0.7 0.4   ALKPHOS 61 57   AST 19 17   ALT 11 11   ANIONGAP 17* 12       Significant Imaging: I have reviewed all pertinent imaging results/findings within the past 24 hours.      Assessment/Plan:      * Hypoxia  -likely viral illness  -weaned to room air  -continue breathing treatments      Vapes nicotine containing substance  -counseled on cessation        Otitis externa  Cortisporin otic drops TID and Clotrimazole cream BID  Consider Acetic acid drops on discharge       Acute bronchitis  IV Steroids  HELADIO, LABA, ICS neb txs   Pulmonology consulted, appreciate input    Bilateral pneumonia  -procal normal  -Likely viral  illness  -Discontinue antibiotics in am      Acute hypoxemic respiratory failure  Patient with Hypoxic Respiratory failure which is Acute.  he is not on home oxygen. Supplemental oxygen was provided and noted-      .   Signs/symptoms of respiratory failure include- tachypnea and use of accessory muscles. Contributing diagnoses includes - Pneumonia Labs and images were reviewed. Patient Has recent ABG, which has been reviewed. Will treat underlying causes and adjust management of respiratory failure         VTE Risk Mitigation (From admission, onward)         Ordered     enoxaparin injection 40 mg  Daily         08/24/23 2129     IP VTE LOW RISK PATIENT  Once         08/24/23 2129     Place sequential compression device  Until discontinued         08/24/23 2129                Discharge Planning   HAZEL:      Code Status: Full Code   Is the patient medically ready for discharge?:     Reason for patient still in hospital (select all that apply): Patient trending condition, Treatment and Consult recommendations  Discharge Plan A: Home with family                  Roz Calderón MD  Department of Hospital Medicine   [facility] - Med Surg 3

## 2023-08-25 NOTE — ASSESSMENT & PLAN NOTE
Patient with Hypoxic Respiratory failure which is Acute.  he is not on home oxygen. Supplemental oxygen was provided and noted-      .   Signs/symptoms of respiratory failure include- tachypnea and use of accessory muscles. Contributing diagnoses includes - Pneumonia Labs and images were reviewed. Patient Has recent ABG, which has been reviewed. Will treat underlying causes and adjust management of respiratory failure

## 2023-08-25 NOTE — PLAN OF CARE
Remains injury free. C/O pain to chest after coughing, received relief with Norco and Tessalon pearls. Resting comfortably. Cardiac monitoring in progress. Tolerating diet. No s/s acute distress.

## 2023-08-26 NOTE — PLAN OF CARE
" Pt requesting to leave AMA. Pt educated about the severity of  his diagnoses and need to stay for further evaluation. Hospital  medicine was notified and came to educated the pt  as well. Pt still leaving AMA despite pt  education, stating," He knows when something  is wrong and will come back if I need to." IV antibiotics administered. Chart check complete.     Problem: Adult Inpatient Plan of Care  Goal: Plan of Care Review  Outcome: Ongoing, Not Progressing     Problem: Infection (Pneumonia)  Goal: Resolution of Infection Signs and Symptoms  Outcome: Ongoing, Not Progressing     Problem: Respiratory Compromise (Pneumonia)  Goal: Effective Oxygenation and Ventilation  Outcome: Ongoing, Not Progressing     "

## 2023-08-26 NOTE — CARE UPDATE
"Pt requesting to be discharged. Nurse reports pt's O2 sats dropped to 88% with minimal exertion. Breath sounds with scattered wheezing/rhonchi throughout. Encouraged pt to remain in hospital. Offered Nicotine patch and Anxiety medication. Offered option to remove CM and shower. Pt declined all these options,   The patient is over the age of 18 years, exhibits no evidence of an altered level of consciousness, and possesses appropriate decision-making capacity based on their ability to understand and communicate rationally.  Patient was advised, that, for an optimal recovery, they should be responsible for complying with the individualized treatment plan provided today by the medical professionals at [facility].      I informed the patient that failing to take responsibility for their health and safety and not complying with the recommendations provided to today is deemed to be against our medical advice. The patient was provided clear and verbal details regarding alternatives, benefits, risks, and complications related to their condition.    I have personally discussed at great length that without further evaluation and monitoring there may be unforeseen circumstances and/or deterioration; including being made aware of the serious complications that may be associated with their condition, including but not limited to: deterioration of current medical condition, respiratory failure, and death.   The patient verbalized these risks back to me in laymans terms. The patient was able to discuss the treatment that was recommended and, was aware of specific risks associated with the refusal of care relating to their specific condition.    The patient states he "will think about it", however, he then stated he wanted to leave AMA.     Patient was instructed to return to the emergency department if his condition changes or he wishes to comply with our treatment recommendations.     "

## 2023-08-27 NOTE — DISCHARGE SUMMARY
[facility] - Med Surg 3  Hospital Medicine  Discharge Summary      Patient Name: Rohan Chni  MRN: [id number]  JASON: [id number]  Patient Class: OP- Observation  Admission Date: 8/24/2023  Hospital Length of Stay: 0 days  Discharge Date and Time: 8/25/2023 10:35 PM  Attending Physician: Diane att. providers found   Discharging Provider: Roz Calderón MD  Primary Care Provider: Diane, Primary Doctor    Primary Care Team: Networked reference to record PCT     HPI:   The patient is a 18 yo male Smoker (vape use) male who sent  to [facility] ED by PCP for Hypoxia. The patient reports nasal and chest congestion, cough, and SOB- onset 2 weeks ago that gradually worsened. He went to [facility] PCP today and O2 sats were 88%. He was given an IM injection of dexamethasone 8 mg and sent to ED for evaluation. Pt denies fever/chills. Denies hx of Asthma. He notes that he has chronic problems with both with crusting and drainage for years. He noted his right ear flared up a few days ago.     In the ED, O2sats 91%, tachycardia and tachypnea noted, Afebrile. Labs revealed WBC 14, Mild anion gap acidosis, LA and procalcitonin normal. ABG showed normal PH, Pao2 68, pCo2 29. FLU/COVID negative. CXR showed nothing acute.   CTA chest showed no PE, Small volume bilateral right greater than left subsolid opacities concerning for PNA.     The patient was transferred to [facility] for observation under hospital medicine for PNA, Acute bronchitis, and hypoxemia.       * No surgery found *      Hospital Course:   The patient presented with hypoxia and wheezing on 8/24. He was placed on supplemental oxygen and given breathing treatments. IV steroids initiated. Pulmonology was consulted. Breathing treatments were adjusted. Patient decided to leave AMA despite counseling from staff on 8/25. See note from Jannie Morris on 8/25. Left AMA.       Goals of Care Treatment Preferences:  Code Status: Full Code      Consults:   Consults (From admission, onward)        Status  Ordering Provider     Inpatient consult to Pulmonology  Once        Provider:  Richie Art MD    Completed ASAD GARBER          No new Assessment & Plan notes have been filed under this hospital service since the last note was generated.  Service: Hospital Medicine    Final Active Diagnoses:    Diagnosis Date Noted POA    PRINCIPAL PROBLEM:  Hypoxia [R09.02] 08/25/2023 Yes    Vapes nicotine containing substance [Z72.0] 08/25/2023 Yes    Bilateral pneumonia [J18.9] 08/24/2023 Yes    Acute bronchitis [J20.9] 08/24/2023 Yes    Otitis externa [H60.90] 08/24/2023 Yes      Problems Resolved During this Admission:       Discharged Condition: against medical advice    Disposition: Left Against Medical Adv*    Follow Up:    Patient Instructions:   No discharge procedures on file.    Significant Diagnostic Studies: Radiology:   Imaging Results           CTA Chest Non-Coronary (PE Studies) (Final result)  Result time 08/24/23 18:54:32    Final result by Stephan Aragon MD (08/24/23 18:54:32)                 Impression:      No pulmonary embolism.    Small volume bilateral right greater than left subsolid opacities concerning for COVID pneumonia or other pneumonia.  Noninfectious inflammation or hemorrhage are also within the differential diagnosis.    This report was flagged in Epic as abnormal.    All CT scans at this facility are performed  using dose modulation techniques as appropriate to performed exam including the following:  automated exposure control; adjustment of mA and/or kV according to the patients size (this includes techniques or standardized protocols for targeted exams where dose is matched to indication/reason for exam: i.e. extremities or head);  iterative reconstruction technique.      Electronically signed by: Stephan Aragon  Date:    08/24/2023  Time:    18:54             Narrative:    EXAMINATION:  CTA CHEST NON CORONARY (PE STUDIES)    CLINICAL HISTORY:  Pulmonary embolism (PE)  suspected, unknown D-dimer;    TECHNIQUE:  Low dose axial images, sagittal and coronal reformations were obtained from the thoracic inlet to the lung bases following the IV administration of 100 mL of Omnipaque 350.  Contrast timing was optimized to evaluate the pulmonary arteries.  MIP images were performed.    COMPARISON:  None    FINDINGS:  Base of Neck: No significant abnormality.    Thoracic soft tissues: Unremarkable.    Aorta: Left-sided aortic arch.  No aneurysm and no significant atherosclerosis    Heart: Normal size. No effusion.    Pulmonary vasculature: Pulmonary arteries are well opacified.  There is no pulmonary thromboembolism.    [place]/Mediastinum: No pathologic beka enlargement.    Airways: Patent.    Lungs/Pleura: Small volume bilateral right greater than left subsolid opacities concerning for COVID pneumonia or other pneumonia.  Noninfectious inflammation or hemorrhage are also within the differential diagnosis.    Esophagus: Unremarkable.    Upper Abdomen: No abnormality of the partially imaged upper abdomen.    Bones: No acute fracture. No suspicious lytic or sclerotic lesions.                               X-Ray Chest AP Portable (Final result)  Result time 08/24/23 15:12:12    Final result by Randolph Lieberman MD (08/24/23 15:12:12)                 Impression:      No acute cardiopulmonary disease.      Electronically signed by: Randolph Lieberman MD  Date:    08/24/2023  Time:    15:12             Narrative:    EXAMINATION:  XR CHEST AP PORTABLE    CLINICAL HISTORY:  Dyspnea, unspecified    COMPARISON:  None.    FINDINGS:  The lungs are clear. The cardiac silhouette is within normal limits.  No pleural effusion or pneumothorax or pulmonary edema.  The bones are intact.                                  Pending Diagnostic Studies:     Procedure Component Value Units Date/Time    RESPIRATORY PATHOGENS PANEL (TEM-PCR) Nasopharyngeal [[id number]] Collected: 08/25/23 1153    Order Status: Sent Lab Status:  In process Updated: 08/25/23 1213         Medications:  None    Indwelling Lines/Drains at time of discharge:   Lines/Drains/Airways     None                 Time spent on the discharge of patient: 10 minutes         Roz Calderón MD  Department of Hospital Medicine  [facility] - Med Surg 3

## 2023-08-27 NOTE — HOSPITAL COURSE
The patient presented with hypoxia and wheezing on 8/24. He was placed on supplemental oxygen and given breathing treatments. IV steroids initiated. Pulmonology was consulted. Breathing treatments were adjusted. Patient decided to leave AMA despite counseling from staff on 8/25. See note from Jannie Morris on 8/25. Left AMA.

## 2023-08-29 LAB
BACTERIA SPEC AEROBE CULT: ABNORMAL
BACTERIA SPEC AEROBE CULT: ABNORMAL
GRAM STN SPEC: ABNORMAL

## 2023-08-30 LAB
BACTERIA BLD CULT: NORMAL
BACTERIA BLD CULT: NORMAL
ENTEROVIRUS/RHINOVIRUS: NOT DETECTED
HUMAN BOCAVIRUS: NOT DETECTED
HUMAN CORONAVIRUS, COMMON COLD VIRUS: NOT DETECTED
INFLUENZA A - H1N1-09: NOT DETECTED
LEGIONELLA PNEUMOPHILA: NOT DETECTED
MORAXELLA CATARRHALIS: NOT DETECTED
PARAINFLUENZA: NOT DETECTED
RVP - ADENOVIRUS: NOT DETECTED
RVP - HUMAN METAPNEUMOVIRUS (HMPV): NOT DETECTED
RVP - INFLUENZA A: NOT DETECTED
RVP - INFLUENZA B: NOT DETECTED
RVP - RESPIRATORY SYNCTIAL VIRUS (RSV) A: NOT DETECTED
RVP - RESPIRATORY VIRAL PANEL, SOURCE: ABNORMAL
TEM - ACINETOBACTER BAUMANNII: NOT DETECTED
TEM - BORDETELLA PERTUSSIS: NOT DETECTED
TEM - CHLAMYDOPHILA PNEUMONIAE: NOT DETECTED
TEM - KLEBSIELLA PNEUMONIAE: NOT DETECTED
TEM - MRSA: POSITIVE
TEM - MYCOPLASMA PNEUMONIAE: NOT DETECTED
TEM - NEISSERIA MENINGITIDIS: NOT DETECTED
TEM - PANTON-VALENTINE: NOT DETECTED
TEM - PSEUDOMONAS AERUGINOSA: NOT DETECTED
TEM - STAPHYLOCOCCUS AUREUS: NOT DETECTED
TEM - STREPTOCOCCUS PNEUMONIAE: NOT DETECTED
TEM - STREPTOCOCCUS PYOGENES A: NOT DETECTED
TEM- HAEMOPHILUS INFLUENZAE B: NOT DETECTED
TEM- HAEMOPHILUS INFLUENZAE: NOT DETECTED

## 2023-10-10 ENCOUNTER — HOSPITAL ENCOUNTER (OUTPATIENT)
Dept: RADIOLOGY | Facility: HOSPITAL | Age: 18
Discharge: HOME | End: 2023-10-10
Attending: NURSE PRACTITIONER
Payer: MEDICAID

## 2023-10-10 DIAGNOSIS — J18.9 UNRESOLVED PNEUMONIA: Primary | ICD-10-CM

## 2023-10-10 DIAGNOSIS — J18.9 UNRESOLVED PNEUMONIA: ICD-10-CM

## 2023-10-10 PROCEDURE — 71046 X-RAY EXAM CHEST 2 VIEWS: CPT | Mod: 26,,, | Performed by: RADIOLOGY

## 2023-10-10 PROCEDURE — 71046 X-RAY EXAM CHEST 2 VIEWS: CPT | Mod: TC,PO

## 2023-10-10 PROCEDURE — 71046 XR CHEST PA AND LATERAL: ICD-10-PCS | Mod: 26,,, | Performed by: RADIOLOGY

## 2023-11-27 PROBLEM — J96.01 ACUTE HYPOXEMIC RESPIRATORY FAILURE: Status: RESOLVED | Noted: 2023-08-24 | Resolved: 2023-11-27

## 2023-11-27 PROBLEM — J18.9 BILATERAL PNEUMONIA: Status: RESOLVED | Noted: 2023-08-24 | Resolved: 2023-11-27

## 2024-01-04 ENCOUNTER — HOSPITAL ENCOUNTER (INPATIENT)
Facility: HOSPITAL | Age: 19
LOS: 2 days | Discharge: HOME | DRG: 203 | End: 2024-01-06
Attending: EMERGENCY MEDICINE | Admitting: FAMILY MEDICINE
Payer: MEDICAID

## 2024-01-04 DIAGNOSIS — U07.0 E-CIGARETTE OR VAPING PRODUCT USE ASSOCIATED LUNG INJURY (EVALI): Primary | ICD-10-CM

## 2024-01-04 DIAGNOSIS — J98.01 BRONCHOSPASM: ICD-10-CM

## 2024-01-04 DIAGNOSIS — Z72.0 TOBACCO ABUSE: ICD-10-CM

## 2024-01-04 DIAGNOSIS — R09.02 HYPOXEMIA: ICD-10-CM

## 2024-01-04 DIAGNOSIS — J45.51 SEVERE PERSISTENT ASTHMA WITH EXACERBATION: ICD-10-CM

## 2024-01-04 DIAGNOSIS — R06.02 SOB (SHORTNESS OF BREATH): ICD-10-CM

## 2024-01-04 DIAGNOSIS — J96.01 ACUTE RESPIRATORY FAILURE WITH HYPOXIA: ICD-10-CM

## 2024-01-04 PROBLEM — J45.901 ASTHMA EXACERBATION: Status: ACTIVE | Noted: 2024-01-04

## 2024-01-04 PROBLEM — F17.200 TOBACCO DEPENDENCE: Status: ACTIVE | Noted: 2024-01-04

## 2024-01-04 LAB
ALBUMIN SERPL BCP-MCNC: 4.2 G/DL (ref 3.5–5.2)
ALLENS TEST: ABNORMAL
ALP SERPL-CCNC: 54 U/L (ref 55–135)
ALT SERPL W/O P-5'-P-CCNC: 11 U/L (ref 10–44)
AMPHET+METHAMPHET UR QL: NEGATIVE
ANION GAP SERPL CALC-SCNC: 11 MMOL/L (ref 8–16)
AST SERPL-CCNC: 18 U/L (ref 10–40)
BARBITURATES UR QL SCN>200 NG/ML: NEGATIVE
BASOPHILS # BLD AUTO: 0.1 K/UL (ref 0–0.2)
BASOPHILS NFR BLD: 0.9 % (ref 0–1.9)
BENZODIAZ UR QL SCN>200 NG/ML: NEGATIVE
BILIRUB SERPL-MCNC: 0.3 MG/DL (ref 0.1–1)
BILIRUB UR QL STRIP: NEGATIVE
BNP SERPL-MCNC: 14 PG/ML (ref 0–99)
BUN SERPL-MCNC: 12 MG/DL (ref 6–20)
BZE UR QL SCN: NEGATIVE
CALCIUM SERPL-MCNC: 9.4 MG/DL (ref 8.7–10.5)
CANNABINOIDS UR QL SCN: ABNORMAL
CHLORIDE SERPL-SCNC: 103 MMOL/L (ref 95–110)
CLARITY UR REFRACT.AUTO: CLEAR
CO2 SERPL-SCNC: 26 MMOL/L (ref 23–29)
COLOR UR AUTO: YELLOW
CREAT SERPL-MCNC: 0.8 MG/DL (ref 0.5–1.4)
CREAT UR-MCNC: 70.7 MG/DL (ref 23–375)
CTP QC/QA: YES
CTP QC/QA: YES
D DIMER PPP IA.FEU-MCNC: <0.19 MG/L FEU
DELSYS: ABNORMAL
DIFFERENTIAL METHOD BLD: ABNORMAL
EOSINOPHIL # BLD AUTO: 2.1 K/UL (ref 0–0.5)
EOSINOPHIL NFR BLD: 19.1 % (ref 0–8)
ERYTHROCYTE [DISTWIDTH] IN BLOOD BY AUTOMATED COUNT: 12.2 % (ref 11.5–14.5)
EST. GFR  (NO RACE VARIABLE): >60 ML/MIN/1.73 M^2
ETHANOL SERPL-MCNC: <10 MG/DL
FIO2: 21
GLUCOSE SERPL-MCNC: 96 MG/DL (ref 70–110)
GLUCOSE UR QL STRIP: NEGATIVE
HCO3 UR-SCNC: 27.4 MMOL/L (ref 24–28)
HCT VFR BLD AUTO: 47 % (ref 40–54)
HGB BLD-MCNC: 16.2 G/DL (ref 14–18)
HGB UR QL STRIP: NEGATIVE
IMM GRANULOCYTES # BLD AUTO: 0.03 K/UL (ref 0–0.04)
IMM GRANULOCYTES NFR BLD AUTO: 0.3 % (ref 0–0.5)
KETONES UR QL STRIP: NEGATIVE
LEUKOCYTE ESTERASE UR QL STRIP: NEGATIVE
LYMPHOCYTES # BLD AUTO: 2.4 K/UL (ref 1–4.8)
LYMPHOCYTES NFR BLD: 22.2 % (ref 18–48)
MCH RBC QN AUTO: 31.2 PG (ref 27–31)
MCHC RBC AUTO-ENTMCNC: 34.5 G/DL (ref 32–36)
MCV RBC AUTO: 90 FL (ref 82–98)
METHADONE UR QL SCN>300 NG/ML: NEGATIVE
MODE: ABNORMAL
MONOCYTES # BLD AUTO: 0.9 K/UL (ref 0.3–1)
MONOCYTES NFR BLD: 8.5 % (ref 4–15)
NEUTROPHILS # BLD AUTO: 5.3 K/UL (ref 1.8–7.7)
NEUTROPHILS NFR BLD: 49 % (ref 38–73)
NITRITE UR QL STRIP: NEGATIVE
NRBC BLD-RTO: 0 /100 WBC
OPIATES UR QL SCN: ABNORMAL
PCO2 BLDA: 45 MMHG (ref 35–45)
PCP UR QL SCN>25 NG/ML: NEGATIVE
PH SMN: 7.39 [PH] (ref 7.35–7.45)
PH UR STRIP: 6 [PH] (ref 5–8)
PLATELET # BLD AUTO: 319 K/UL (ref 150–450)
PMV BLD AUTO: 10 FL (ref 9.2–12.9)
PO2 BLDA: 57 MMHG (ref 80–100)
POC BE: 3 MMOL/L
POC MOLECULAR INFLUENZA A AGN: NEGATIVE
POC MOLECULAR INFLUENZA B AGN: NEGATIVE
POC SATURATED O2: 89 % (ref 95–100)
POTASSIUM SERPL-SCNC: 4 MMOL/L (ref 3.5–5.1)
PROT SERPL-MCNC: 7.2 G/DL (ref 6–8.4)
PROT UR QL STRIP: NEGATIVE
RBC # BLD AUTO: 5.2 M/UL (ref 4.6–6.2)
SAMPLE: ABNORMAL
SARS-COV-2 RDRP RESP QL NAA+PROBE: NEGATIVE
SITE: ABNORMAL
SODIUM SERPL-SCNC: 140 MMOL/L (ref 136–145)
SP GR UR STRIP: 1.01 (ref 1–1.03)
SP02: 88
TOXICOLOGY INFORMATION: ABNORMAL
TROPONIN I SERPL DL<=0.01 NG/ML-MCNC: <0.006 NG/ML (ref 0–0.03)
URN SPEC COLLECT METH UR: NORMAL
UROBILINOGEN UR STRIP-ACNC: NEGATIVE EU/DL
WBC # BLD AUTO: 10.75 K/UL (ref 3.9–12.7)

## 2024-01-04 PROCEDURE — 27000207 HC ISOLATION

## 2024-01-04 PROCEDURE — S4991 NICOTINE PATCH NONLEGEND: HCPCS | Performed by: FAMILY MEDICINE

## 2024-01-04 PROCEDURE — 85025 COMPLETE CBC W/AUTO DIFF WBC: CPT | Mod: ER | Performed by: EMERGENCY MEDICINE

## 2024-01-04 PROCEDURE — 87635 SARS-COV-2 COVID-19 AMP PRB: CPT | Mod: ER | Performed by: EMERGENCY MEDICINE

## 2024-01-04 PROCEDURE — 80053 COMPREHEN METABOLIC PANEL: CPT | Mod: ER | Performed by: EMERGENCY MEDICINE

## 2024-01-04 PROCEDURE — 96375 TX/PRO/DX INJ NEW DRUG ADDON: CPT | Mod: ER

## 2024-01-04 PROCEDURE — 94660 CPAP INITIATION&MGMT: CPT | Mod: ER

## 2024-01-04 PROCEDURE — 85379 FIBRIN DEGRADATION QUANT: CPT | Mod: ER | Performed by: EMERGENCY MEDICINE

## 2024-01-04 PROCEDURE — 81003 URINALYSIS AUTO W/O SCOPE: CPT | Mod: ER,59 | Performed by: EMERGENCY MEDICINE

## 2024-01-04 PROCEDURE — 93005 ELECTROCARDIOGRAM TRACING: CPT | Mod: ER

## 2024-01-04 PROCEDURE — 96365 THER/PROPH/DIAG IV INF INIT: CPT | Mod: ER

## 2024-01-04 PROCEDURE — 63600175 PHARM REV CODE 636 W HCPCS: Performed by: FAMILY MEDICINE

## 2024-01-04 PROCEDURE — 82803 BLOOD GASES ANY COMBINATION: CPT | Mod: ER

## 2024-01-04 PROCEDURE — 84484 ASSAY OF TROPONIN QUANT: CPT | Mod: ER | Performed by: EMERGENCY MEDICINE

## 2024-01-04 PROCEDURE — 36600 WITHDRAWAL OF ARTERIAL BLOOD: CPT | Mod: ER

## 2024-01-04 PROCEDURE — 25000242 PHARM REV CODE 250 ALT 637 W/ HCPCS: Performed by: FAMILY MEDICINE

## 2024-01-04 PROCEDURE — 87502 INFLUENZA DNA AMP PROBE: CPT | Mod: ER

## 2024-01-04 PROCEDURE — 96361 HYDRATE IV INFUSION ADD-ON: CPT | Mod: ER

## 2024-01-04 PROCEDURE — 83880 ASSAY OF NATRIURETIC PEPTIDE: CPT | Mod: ER | Performed by: EMERGENCY MEDICINE

## 2024-01-04 PROCEDURE — 96372 THER/PROPH/DIAG INJ SC/IM: CPT | Performed by: EMERGENCY MEDICINE

## 2024-01-04 PROCEDURE — 94640 AIRWAY INHALATION TREATMENT: CPT | Mod: ER,XB

## 2024-01-04 PROCEDURE — 63600175 PHARM REV CODE 636 W HCPCS: Mod: ER | Performed by: EMERGENCY MEDICINE

## 2024-01-04 PROCEDURE — 25000003 PHARM REV CODE 250: Performed by: FAMILY MEDICINE

## 2024-01-04 PROCEDURE — 25000003 PHARM REV CODE 250: Performed by: HOSPITALIST

## 2024-01-04 PROCEDURE — 27000221 HC OXYGEN, UP TO 24 HOURS: Mod: ER

## 2024-01-04 PROCEDURE — 94640 AIRWAY INHALATION TREATMENT: CPT

## 2024-01-04 PROCEDURE — 25000242 PHARM REV CODE 250 ALT 637 W/ HCPCS: Mod: ER | Performed by: EMERGENCY MEDICINE

## 2024-01-04 PROCEDURE — 80307 DRUG TEST PRSMV CHEM ANLYZR: CPT | Mod: ER | Performed by: EMERGENCY MEDICINE

## 2024-01-04 PROCEDURE — 27000190 HC CPAP FULL FACE MASK W/VALVE: Mod: ER

## 2024-01-04 PROCEDURE — 99900035 HC TECH TIME PER 15 MIN (STAT): Mod: ER

## 2024-01-04 PROCEDURE — 11000001 HC ACUTE MED/SURG PRIVATE ROOM

## 2024-01-04 PROCEDURE — 82077 ASSAY SPEC XCP UR&BREATH IA: CPT | Mod: ER | Performed by: EMERGENCY MEDICINE

## 2024-01-04 PROCEDURE — S4991 NICOTINE PATCH NONLEGEND: HCPCS | Performed by: HOSPITALIST

## 2024-01-04 PROCEDURE — 93010 ELECTROCARDIOGRAM REPORT: CPT | Mod: ,,, | Performed by: INTERNAL MEDICINE

## 2024-01-04 PROCEDURE — 82800 BLOOD PH: CPT | Mod: ER

## 2024-01-04 PROCEDURE — 94799 UNLISTED PULMONARY SVC/PX: CPT | Mod: ER

## 2024-01-04 PROCEDURE — 99291 CRITICAL CARE FIRST HOUR: CPT | Mod: ER

## 2024-01-04 PROCEDURE — 94761 N-INVAS EAR/PLS OXIMETRY MLT: CPT | Mod: ER,XB

## 2024-01-04 RX ORDER — ACETAMINOPHEN 325 MG/1
650 TABLET ORAL EVERY 6 HOURS PRN
Status: DISCONTINUED | OUTPATIENT
Start: 2024-01-04 | End: 2024-01-06 | Stop reason: HOSPADM

## 2024-01-04 RX ORDER — MAGNESIUM SULFATE HEPTAHYDRATE 40 MG/ML
2 INJECTION, SOLUTION INTRAVENOUS
Status: COMPLETED | OUTPATIENT
Start: 2024-01-04 | End: 2024-01-04

## 2024-01-04 RX ORDER — IBUPROFEN 200 MG
1 TABLET ORAL DAILY
Status: DISCONTINUED | OUTPATIENT
Start: 2024-01-04 | End: 2024-01-05

## 2024-01-04 RX ORDER — ONDANSETRON 4 MG/1
4 TABLET, ORALLY DISINTEGRATING ORAL EVERY 6 HOURS PRN
Status: DISCONTINUED | OUTPATIENT
Start: 2024-01-04 | End: 2024-01-06 | Stop reason: HOSPADM

## 2024-01-04 RX ORDER — MOMETASONE FUROATE AND FORMOTEROL FUMARATE DIHYDRATE 100; 5 UG/1; UG/1
AEROSOL RESPIRATORY (INHALATION)
COMMUNITY

## 2024-01-04 RX ORDER — NICOTINE 7MG/24HR
1 PATCH, TRANSDERMAL 24 HOURS TRANSDERMAL DAILY
Status: DISCONTINUED | OUTPATIENT
Start: 2024-01-04 | End: 2024-01-04

## 2024-01-04 RX ORDER — ALBUTEROL SULFATE 90 UG/1
AEROSOL, METERED RESPIRATORY (INHALATION)
COMMUNITY

## 2024-01-04 RX ORDER — ALBUTEROL SULFATE 0.83 MG/ML
SOLUTION RESPIRATORY (INHALATION)
Status: DISCONTINUED
Start: 2024-01-04 | End: 2024-01-04 | Stop reason: WASHOUT

## 2024-01-04 RX ORDER — METHYLPREDNISOLONE SOD SUCC 125 MG
40 VIAL (EA) INJECTION EVERY 8 HOURS
Status: DISCONTINUED | OUTPATIENT
Start: 2024-01-04 | End: 2024-01-06 | Stop reason: HOSPADM

## 2024-01-04 RX ORDER — INHALER, ASSIST DEVICES
SPACER (EA) MISCELLANEOUS
COMMUNITY
Start: 2023-10-14

## 2024-01-04 RX ORDER — SODIUM CHLORIDE, SODIUM LACTATE, POTASSIUM CHLORIDE, CALCIUM CHLORIDE 600; 310; 30; 20 MG/100ML; MG/100ML; MG/100ML; MG/100ML
1000 INJECTION, SOLUTION INTRAVENOUS
Status: COMPLETED | OUTPATIENT
Start: 2024-01-04 | End: 2024-01-04

## 2024-01-04 RX ORDER — EPINEPHRINE 1 MG/ML
0.3 INJECTION, SOLUTION, CONCENTRATE INTRAVENOUS
Status: COMPLETED | OUTPATIENT
Start: 2024-01-04 | End: 2024-01-04

## 2024-01-04 RX ORDER — METHYLPREDNISOLONE SOD SUCC 125 MG
125 VIAL (EA) INJECTION
Status: COMPLETED | OUTPATIENT
Start: 2024-01-04 | End: 2024-01-04

## 2024-01-04 RX ORDER — IPRATROPIUM BROMIDE AND ALBUTEROL SULFATE 2.5; .5 MG/3ML; MG/3ML
3 SOLUTION RESPIRATORY (INHALATION)
Status: COMPLETED | OUTPATIENT
Start: 2024-01-04 | End: 2024-01-04

## 2024-01-04 RX ORDER — ALBUTEROL SULFATE 0.83 MG/ML
7.5 SOLUTION RESPIRATORY (INHALATION)
Status: COMPLETED | OUTPATIENT
Start: 2024-01-04 | End: 2024-01-04

## 2024-01-04 RX ORDER — METHYLPREDNISOLONE SOD SUCC 125 MG
40 VIAL (EA) INJECTION EVERY 8 HOURS
Status: DISCONTINUED | OUTPATIENT
Start: 2024-01-04 | End: 2024-01-04

## 2024-01-04 RX ORDER — IPRATROPIUM BROMIDE AND ALBUTEROL SULFATE 2.5; .5 MG/3ML; MG/3ML
3 SOLUTION RESPIRATORY (INHALATION)
Status: DISCONTINUED | OUTPATIENT
Start: 2024-01-04 | End: 2024-01-05

## 2024-01-04 RX ADMIN — MAGNESIUM SULFATE HEPTAHYDRATE 2 G: 40 INJECTION, SOLUTION INTRAVENOUS at 03:01

## 2024-01-04 RX ADMIN — IPRATROPIUM BROMIDE AND ALBUTEROL SULFATE 3 ML: .5; 3 SOLUTION RESPIRATORY (INHALATION) at 03:01

## 2024-01-04 RX ADMIN — METHYLPREDNISOLONE SODIUM SUCCINATE 125 MG: 125 INJECTION, POWDER, FOR SOLUTION INTRAMUSCULAR; INTRAVENOUS at 03:01

## 2024-01-04 RX ADMIN — EPINEPHRINE 0.3 MG: 1 INJECTION, SOLUTION, CONCENTRATE INTRAVENOUS at 03:01

## 2024-01-04 RX ADMIN — IPRATROPIUM BROMIDE AND ALBUTEROL SULFATE 3 ML: 2.5; .5 SOLUTION RESPIRATORY (INHALATION) at 07:01

## 2024-01-04 RX ADMIN — IPRATROPIUM BROMIDE AND ALBUTEROL SULFATE 3 ML: 2.5; .5 SOLUTION RESPIRATORY (INHALATION) at 04:01

## 2024-01-04 RX ADMIN — NICOTINE 1 PATCH: 21 PATCH, EXTENDED RELEASE TRANSDERMAL at 10:01

## 2024-01-04 RX ADMIN — METHYLPREDNISOLONE SODIUM SUCCINATE 40 MG: 125 INJECTION, POWDER, FOR SOLUTION INTRAMUSCULAR; INTRAVENOUS at 10:01

## 2024-01-04 RX ADMIN — ALBUTEROL SULFATE 7.5 MG: 0.83 SOLUTION RESPIRATORY (INHALATION) at 06:01

## 2024-01-04 RX ADMIN — NICOTINE 1 PATCH: 7 PATCH, EXTENDED RELEASE TRANSDERMAL at 06:01

## 2024-01-04 RX ADMIN — METHYLPREDNISOLONE SODIUM SUCCINATE 40 MG: 125 INJECTION, POWDER, FOR SOLUTION INTRAMUSCULAR; INTRAVENOUS at 05:01

## 2024-01-04 RX ADMIN — SODIUM CHLORIDE, SODIUM LACTATE, POTASSIUM CHLORIDE, AND CALCIUM CHLORIDE 1000 ML: 600; 310; 30; 20 INJECTION, SOLUTION INTRAVENOUS at 03:01

## 2024-01-04 RX ADMIN — SODIUM CHLORIDE, SODIUM LACTATE, POTASSIUM CHLORIDE, AND CALCIUM CHLORIDE 500 ML: .6; .31; .03; .02 INJECTION, SOLUTION INTRAVENOUS at 03:01

## 2024-01-04 NOTE — HPI
Patient is a 19 y/o  male with a Pmh of asthma who presents to [facility] ED for sob. He was recently diagnosed with asthma a few months ago and has seen pulm outpatient. Currently on albuterol rescue and dulera inhaler at home. He states he use to vape chronically however has been trying to switch and started smoking cigarettes instead. He reports sob and wheezing not controlled with home inhalers. Denies any sick contacts, fever, chills.     In the ED, patient was started on bipap due to work of breathing. He was given epi, mag sulfate, solumedrol, duonebs. He was weaned down to NC. Chest xray unremarkable. UDS positive for opioids and thc.

## 2024-01-04 NOTE — H&P
[facility] [address] [place] [address] Medicine  History & Physical    Patient Name: Rohan Chin  MRN: [id number]  Patient Class: IP- Inpatient  Admission Date: 1/4/2024  Attending Physician: Aayush Fry MD  Primary Care Provider: Fide Steen FNP-C         Patient information was obtained from patient and ER records.     Subjective:     Principal Problem:Asthma exacerbation    Chief Complaint:   Chief Complaint   Patient presents with    Wheezing     C/o wheezing        HPI: Patient is a 17 y/o  male with a Pmh of asthma who presents to [facility] ED for sob. He was recently diagnosed with asthma a few months ago and has seen pulm outpatient. Currently on albuterol rescue and dulera inhaler at home. He states he use to vape chronically however has been trying to switch and started smoking cigarettes instead. He reports sob and wheezing not controlled with home inhalers. Denies any sick contacts, fever, chills.     In the ED, patient was started on bipap due to work of breathing. He was given epi, mag sulfate, solumedrol, duonebs. He was weaned down to NC. Chest xray unremarkable. UDS positive for opioids and thc.           Past Medical History:   Diagnosis Date    Acute respiratory failure     Asthma with acute exacerbation     Hypercapnia     Lactic acidosis     Marijuana use     Otomycosis of both ears     Severe protein-calorie malnutrition     SIRS (systemic inflammatory response syndrome)     Thrombocytosis     Vapes nicotine containing substance        Past Surgical History:   Procedure Laterality Date    INCISION AND DRAINAGE OF ABSCESS      scalp       Review of patient's allergies indicates:  No Known Allergies    No current facility-administered medications on file prior to encounter.     Current Outpatient Medications on File Prior to Encounter   Medication Sig    AEROCHAMBER MV inhaler For use with albuterol/Dulera inhalers    albuterol (PROVENTIL/VENTOLIN HFA) 90 mcg/actuation inhaler Inhale  into the lungs.    DULERA 100-5 mcg/actuation HFAA Inhale into the lungs.    mupirocin (BACTROBAN) 2 % ointment Apply topically 3 (three) times daily.    ibuprofen (ADVIL,MOTRIN) 600 MG tablet Take 1 tablet (600 mg total) by mouth every 6 (six) hours as needed for Pain.    sulfamethoxazole-trimethoprim 400-80mg (BACTRIM,SEPTRA) 400-80 mg per tablet Take 1 tablet by mouth 2 (two) times daily.     Family History       Problem Relation (Age of Onset)    No Known Problems Mother    Ulcerative colitis Father          Tobacco Use    Smoking status: Every Day     Current packs/day: 0.25     Types: Cigarettes    Smokeless tobacco: Never   Substance and Sexual Activity    Alcohol use: No    Drug use: Yes     Frequency: 1.0 times per week     Types: Marijuana    Sexual activity: Not on file     Review of Systems   Constitutional:  Negative for fatigue and fever.   HENT:  Negative for sinus pressure.    Eyes:  Negative for visual disturbance.   Respiratory:  Positive for shortness of breath and wheezing.    Cardiovascular:  Negative for chest pain.   Gastrointestinal:  Negative for nausea and vomiting.   Genitourinary:  Negative for difficulty urinating.   Musculoskeletal:  Negative for back pain.   Skin:  Negative for rash.   Neurological:  Negative for headaches.   Psychiatric/Behavioral:  Negative for confusion.      Objective:     Vital Signs (Most Recent):  Temp: 98.2 °F (36.8 °C) (01/04/24 1602)  Pulse: 64 (01/04/24 1647)  Resp: 18 (01/04/24 1647)  BP: 123/62 (01/04/24 1602)  SpO2: 95 % (01/04/24 1647) Vital Signs (24h Range):  Temp:  [97.3 °F (36.3 °C)-98.3 °F (36.8 °C)] 98.2 °F (36.8 °C)  Pulse:  [] 64  Resp:  [11-28] 18  SpO2:  [87 %-100 %] 95 %  BP: ()/(54-84) 123/62     Weight: 58 kg (127 lb 13.9 oz)  Body mass index is 19.44 kg/m².     Physical Exam  Constitutional:       General: He is not in acute distress.     Appearance: He is well-developed. He is not diaphoretic.   HENT:      Head: Normocephalic  and atraumatic.   Eyes:      Pupils: Pupils are equal, round, and reactive to light.   Cardiovascular:      Rate and Rhythm: Normal rate and regular rhythm.      Heart sounds: Normal heart sounds. No murmur heard.     No friction rub. No gallop.   Pulmonary:      Effort: Pulmonary effort is normal. No respiratory distress.      Breath sounds: No stridor. Wheezing (inspiratory and expiratory) present. No rales.   Abdominal:      General: Bowel sounds are normal. There is no distension.      Palpations: Abdomen is soft. There is no mass.      Tenderness: There is no abdominal tenderness. There is no guarding.   Skin:     General: Skin is warm.      Findings: No erythema.   Neurological:      Mental Status: He is alert and oriented to person, place, and time.              CRANIAL NERVES     CN III, IV, VI   Pupils are equal, round, and reactive to light.       Significant Labs:   Results for orders placed or performed during the hospital encounter of 01/04/24   CBC auto differential   Result Value Ref Range    WBC 10.75 3.90 - 12.70 K/uL    RBC 5.20 4.60 - 6.20 M/uL    Hemoglobin 16.2 14.0 - 18.0 g/dL    Hematocrit 47.0 40.0 - 54.0 %    MCV 90 82 - 98 fL    MCH 31.2 (H) 27.0 - 31.0 pg    MCHC 34.5 32.0 - 36.0 g/dL    RDW 12.2 11.5 - 14.5 %    Platelets 319 150 - 450 K/uL    MPV 10.0 9.2 - 12.9 fL    Immature Granulocytes 0.3 0.0 - 0.5 %    Gran # (ANC) 5.3 1.8 - 7.7 K/uL    Immature Grans (Abs) 0.03 0.00 - 0.04 K/uL    Lymph # 2.4 1.0 - 4.8 K/uL    Mono # 0.9 0.3 - 1.0 K/uL    Eos # 2.1 (H) 0.0 - 0.5 K/uL    Baso # 0.10 0.00 - 0.20 K/uL    nRBC 0 0 /100 WBC    Gran % 49.0 38.0 - 73.0 %    Lymph % 22.2 18.0 - 48.0 %    Mono % 8.5 4.0 - 15.0 %    Eosinophil % 19.1 (H) 0.0 - 8.0 %    Basophil % 0.9 0.0 - 1.9 %    Differential Method Automated    Comprehensive metabolic panel   Result Value Ref Range    Sodium 140 136 - 145 mmol/L    Potassium 4.0 3.5 - 5.1 mmol/L    Chloride 103 95 - 110 mmol/L    CO2 26 23 - 29 mmol/L     Glucose 96 70 - 110 mg/dL    BUN 12 6 - 20 mg/dL    Creatinine 0.8 0.5 - 1.4 mg/dL    Calcium 9.4 8.7 - 10.5 mg/dL    Total Protein 7.2 6.0 - 8.4 g/dL    Albumin 4.2 3.5 - 5.2 g/dL    Total Bilirubin 0.3 0.1 - 1.0 mg/dL    Alkaline Phosphatase 54 (L) 55 - 135 U/L    AST 18 10 - 40 U/L    ALT 11 10 - 44 U/L    eGFR >60.0 >60 mL/min/1.73 m^2    Anion Gap 11 8 - 16 mmol/L   Urinalysis, Reflex to Urine Culture Urine, Clean Catch    Specimen: Urine   Result Value Ref Range    Specimen UA Urine, Clean Catch     Color, UA Yellow Yellow, Straw, Jenni    Appearance, UA Clear Clear    pH, UA 6.0 5.0 - 8.0    Specific Gravity, UA 1.015 1.005 - 1.030    Protein, UA Negative Negative    Glucose, UA Negative Negative    Ketones, UA Negative Negative    Bilirubin (UA) Negative Negative    Occult Blood UA Negative Negative    Nitrite, UA Negative Negative    Urobilinogen, UA Negative <2.0 EU/dL    Leukocytes, UA Negative Negative   Ethanol   Result Value Ref Range    Alcohol, Serum <10 <10 mg/dL   Drug screen panel, emergency   Result Value Ref Range    Benzodiazepines Negative Negative    Methadone metabolites Negative Negative    Cocaine (Metab.) Negative Negative    Opiate Scrn, Ur Presumptive Positive (A) Negative    Barbiturate Screen, Ur Negative Negative    Amphetamine Screen, Ur Negative Negative    THC Presumptive Positive (A) Negative    Phencyclidine Negative Negative    Creatinine, Urine 70.7 23.0 - 375.0 mg/dL    Toxicology Information SEE COMMENT    Troponin I   Result Value Ref Range    Troponin I <0.006 0.000 - 0.026 ng/mL   B-Type natriuretic peptide (BNP)   Result Value Ref Range    BNP 14 0 - 99 pg/mL   D dimer, quantitative   Result Value Ref Range    D-Dimer <0.19 <0.50 mg/L FEU   POCT Influenza A/B Molecular   Result Value Ref Range    POC Molecular Influenza A Ag Negative Negative, Not Reported    POC Molecular Influenza B Ag Negative Negative, Not Reported     Acceptable Yes    POCT COVID-19  Rapid Screening   Result Value Ref Range    POC Rapid COVID Negative Negative     Acceptable Yes    ISTAT PROCEDURE   Result Value Ref Range    POC PH 7.393 7.35 - 7.45    POC PCO2 45.0 35 - 45 mmHg    POC PO2 57 (LL) 80 - 100 mmHg    POC HCO3 27.4 24 - 28 mmol/L    POC BE 3 (H) -2 to 2 mmol/L    POC SATURATED O2 89 95 - 100 %    Sample ARTERIAL     Site RR     Allens Test Pass     DelSys Room Air     Mode SPONT     FiO2 21     Sp02 88         Significant Imaging: X-Ray Chest AP Portable  Narrative: EXAMINATION:  XR CHEST AP PORTABLE    CLINICAL HISTORY:  Asthma;    TECHNIQUE:  Single frontal view of the chest was performed.    COMPARISON:  None    FINDINGS:  The lungs are clear, with normal appearance of pulmonary vasculature and no pleural effusion or pneumothorax.    The cardiac silhouette is normal in size. The hilar and mediastinal contours are unremarkable.    Bones are intact.  Impression: No acute abnormality.    Electronically signed by: Stephan Aragon  Date:    01/04/2024  Time:    07:47      Assessment/Plan:     * Asthma exacerbation  Secondary to smoking  Will cont solumedrol   Duonebs   Wean o2 as tolerated  Pt still with persistent expiratory and inspiratory wheezes  Throughout today on exam         Tobacco dependence  Dangers of cigarette smoking were reviewed with patient in detail. Patient was Counseled for 3-10 minutes. Nicotine replacement options were discussed. Nicotine replacement was discussed- will order nicotine patch     Hypoxia  Secondary to asthma exacerbation  Wean o2 as tolerated  Cont treatment as above        VTE Risk Mitigation (From admission, onward)      None                            Aayush Fry MD  Department of Hospital Medicine  [facility]

## 2024-01-04 NOTE — ASSESSMENT & PLAN NOTE
Secondary to smoking  Will cont solumedrol   Duonebs   Wean o2 as tolerated  Pt still with persistent expiratory and inspiratory wheezes  Throughout today on exam

## 2024-01-04 NOTE — SUBJECTIVE & OBJECTIVE
Past Medical History:   Diagnosis Date    Acute respiratory failure     Asthma with acute exacerbation     Hypercapnia     Lactic acidosis     Marijuana use     Otomycosis of both ears     Severe protein-calorie malnutrition     SIRS (systemic inflammatory response syndrome)     Thrombocytosis     Vapes nicotine containing substance        Past Surgical History:   Procedure Laterality Date    INCISION AND DRAINAGE OF ABSCESS      scalp       Review of patient's allergies indicates:  No Known Allergies    No current facility-administered medications on file prior to encounter.     Current Outpatient Medications on File Prior to Encounter   Medication Sig    AEROCHAMBER MV inhaler For use with albuterol/Dulera inhalers    albuterol (PROVENTIL/VENTOLIN HFA) 90 mcg/actuation inhaler Inhale into the lungs.    DULERA 100-5 mcg/actuation HFAA Inhale into the lungs.    mupirocin (BACTROBAN) 2 % ointment Apply topically 3 (three) times daily.    ibuprofen (ADVIL,MOTRIN) 600 MG tablet Take 1 tablet (600 mg total) by mouth every 6 (six) hours as needed for Pain.    sulfamethoxazole-trimethoprim 400-80mg (BACTRIM,SEPTRA) 400-80 mg per tablet Take 1 tablet by mouth 2 (two) times daily.     Family History       Problem Relation (Age of Onset)    No Known Problems Mother    Ulcerative colitis Father          Tobacco Use    Smoking status: Every Day     Current packs/day: 0.25     Types: Cigarettes    Smokeless tobacco: Never   Substance and Sexual Activity    Alcohol use: No    Drug use: Yes     Frequency: 1.0 times per week     Types: Marijuana    Sexual activity: Not on file     Review of Systems   Constitutional:  Negative for fatigue and fever.   HENT:  Negative for sinus pressure.    Eyes:  Negative for visual disturbance.   Respiratory:  Positive for shortness of breath and wheezing.    Cardiovascular:  Negative for chest pain.   Gastrointestinal:  Negative for nausea and vomiting.   Genitourinary:  Negative for difficulty  urinating.   Musculoskeletal:  Negative for back pain.   Skin:  Negative for rash.   Neurological:  Negative for headaches.   Psychiatric/Behavioral:  Negative for confusion.      Objective:     Vital Signs (Most Recent):  Temp: 98.2 °F (36.8 °C) (01/04/24 1602)  Pulse: 64 (01/04/24 1647)  Resp: 18 (01/04/24 1647)  BP: 123/62 (01/04/24 1602)  SpO2: 95 % (01/04/24 1647) Vital Signs (24h Range):  Temp:  [97.3 °F (36.3 °C)-98.3 °F (36.8 °C)] 98.2 °F (36.8 °C)  Pulse:  [] 64  Resp:  [11-28] 18  SpO2:  [87 %-100 %] 95 %  BP: ()/(54-84) 123/62     Weight: 58 kg (127 lb 13.9 oz)  Body mass index is 19.44 kg/m².     Physical Exam  Constitutional:       General: He is not in acute distress.     Appearance: He is well-developed. He is not diaphoretic.   HENT:      Head: Normocephalic and atraumatic.   Eyes:      Pupils: Pupils are equal, round, and reactive to light.   Cardiovascular:      Rate and Rhythm: Normal rate and regular rhythm.      Heart sounds: Normal heart sounds. No murmur heard.     No friction rub. No gallop.   Pulmonary:      Effort: Pulmonary effort is normal. No respiratory distress.      Breath sounds: No stridor. Wheezing (inspiratory and expiratory) present. No rales.   Abdominal:      General: Bowel sounds are normal. There is no distension.      Palpations: Abdomen is soft. There is no mass.      Tenderness: There is no abdominal tenderness. There is no guarding.   Skin:     General: Skin is warm.      Findings: No erythema.   Neurological:      Mental Status: He is alert and oriented to person, place, and time.              CRANIAL NERVES     CN III, IV, VI   Pupils are equal, round, and reactive to light.       Significant Labs:   Results for orders placed or performed during the hospital encounter of 01/04/24   CBC auto differential   Result Value Ref Range    WBC 10.75 3.90 - 12.70 K/uL    RBC 5.20 4.60 - 6.20 M/uL    Hemoglobin 16.2 14.0 - 18.0 g/dL    Hematocrit 47.0 40.0 - 54.0 %     MCV 90 82 - 98 fL    MCH 31.2 (H) 27.0 - 31.0 pg    MCHC 34.5 32.0 - 36.0 g/dL    RDW 12.2 11.5 - 14.5 %    Platelets 319 150 - 450 K/uL    MPV 10.0 9.2 - 12.9 fL    Immature Granulocytes 0.3 0.0 - 0.5 %    Gran # (ANC) 5.3 1.8 - 7.7 K/uL    Immature Grans (Abs) 0.03 0.00 - 0.04 K/uL    Lymph # 2.4 1.0 - 4.8 K/uL    Mono # 0.9 0.3 - 1.0 K/uL    Eos # 2.1 (H) 0.0 - 0.5 K/uL    Baso # 0.10 0.00 - 0.20 K/uL    nRBC 0 0 /100 WBC    Gran % 49.0 38.0 - 73.0 %    Lymph % 22.2 18.0 - 48.0 %    Mono % 8.5 4.0 - 15.0 %    Eosinophil % 19.1 (H) 0.0 - 8.0 %    Basophil % 0.9 0.0 - 1.9 %    Differential Method Automated    Comprehensive metabolic panel   Result Value Ref Range    Sodium 140 136 - 145 mmol/L    Potassium 4.0 3.5 - 5.1 mmol/L    Chloride 103 95 - 110 mmol/L    CO2 26 23 - 29 mmol/L    Glucose 96 70 - 110 mg/dL    BUN 12 6 - 20 mg/dL    Creatinine 0.8 0.5 - 1.4 mg/dL    Calcium 9.4 8.7 - 10.5 mg/dL    Total Protein 7.2 6.0 - 8.4 g/dL    Albumin 4.2 3.5 - 5.2 g/dL    Total Bilirubin 0.3 0.1 - 1.0 mg/dL    Alkaline Phosphatase 54 (L) 55 - 135 U/L    AST 18 10 - 40 U/L    ALT 11 10 - 44 U/L    eGFR >60.0 >60 mL/min/1.73 m^2    Anion Gap 11 8 - 16 mmol/L   Urinalysis, Reflex to Urine Culture Urine, Clean Catch    Specimen: Urine   Result Value Ref Range    Specimen UA Urine, Clean Catch     Color, UA Yellow Yellow, Straw, Jenni    Appearance, UA Clear Clear    pH, UA 6.0 5.0 - 8.0    Specific Gravity, UA 1.015 1.005 - 1.030    Protein, UA Negative Negative    Glucose, UA Negative Negative    Ketones, UA Negative Negative    Bilirubin (UA) Negative Negative    Occult Blood UA Negative Negative    Nitrite, UA Negative Negative    Urobilinogen, UA Negative <2.0 EU/dL    Leukocytes, UA Negative Negative   Ethanol   Result Value Ref Range    Alcohol, Serum <10 <10 mg/dL   Drug screen panel, emergency   Result Value Ref Range    Benzodiazepines Negative Negative    Methadone metabolites Negative Negative    Cocaine (Metab.)  Negative Negative    Opiate Scrn, Ur Presumptive Positive (A) Negative    Barbiturate Screen, Ur Negative Negative    Amphetamine Screen, Ur Negative Negative    THC Presumptive Positive (A) Negative    Phencyclidine Negative Negative    Creatinine, Urine 70.7 23.0 - 375.0 mg/dL    Toxicology Information SEE COMMENT    Troponin I   Result Value Ref Range    Troponin I <0.006 0.000 - 0.026 ng/mL   B-Type natriuretic peptide (BNP)   Result Value Ref Range    BNP 14 0 - 99 pg/mL   D dimer, quantitative   Result Value Ref Range    D-Dimer <0.19 <0.50 mg/L FEU   POCT Influenza A/B Molecular   Result Value Ref Range    POC Molecular Influenza A Ag Negative Negative, Not Reported    POC Molecular Influenza B Ag Negative Negative, Not Reported     Acceptable Yes    POCT COVID-19 Rapid Screening   Result Value Ref Range    POC Rapid COVID Negative Negative     Acceptable Yes    ISTAT PROCEDURE   Result Value Ref Range    POC PH 7.393 7.35 - 7.45    POC PCO2 45.0 35 - 45 mmHg    POC PO2 57 (LL) 80 - 100 mmHg    POC HCO3 27.4 24 - 28 mmol/L    POC BE 3 (H) -2 to 2 mmol/L    POC SATURATED O2 89 95 - 100 %    Sample ARTERIAL     Site RR     Allens Test Pass     DelSys Room Air     Mode SPONT     FiO2 21     Sp02 88         Significant Imaging: X-Ray Chest AP Portable  Narrative: EXAMINATION:  XR CHEST AP PORTABLE    CLINICAL HISTORY:  Asthma;    TECHNIQUE:  Single frontal view of the chest was performed.    COMPARISON:  None    FINDINGS:  The lungs are clear, with normal appearance of pulmonary vasculature and no pleural effusion or pneumothorax.    The cardiac silhouette is normal in size. The hilar and mediastinal contours are unremarkable.    Bones are intact.  Impression: No acute abnormality.    Electronically signed by: Stephan Aragon  Date:    01/04/2024  Time:    07:47

## 2024-01-04 NOTE — ASSESSMENT & PLAN NOTE
Dangers of cigarette smoking were reviewed with patient in detail. Patient was Counseled for 3-10 minutes. Nicotine replacement options were discussed. Nicotine replacement was discussed- will order nicotine patch

## 2024-01-04 NOTE — ED PROVIDER NOTES
Encounter Date: 1/4/2024       History     Chief Complaint   Patient presents with    Wheezing     C/o wheezing     Recent onset reactive airways disease with 2 different hospitalizations, has been seen by Pulmonary and has follow-up soon, on home bronchodilators and inhaled steroids.  Significant heavy abuse history for electronic cigarettes and subsequently tobacco, has been smoking as recently as today.  Presents with tight diffuse wheezing and dyspnea, getting worse.  He has been using his home inhaler as well as possible.  No nausea, vomiting, fever, chills, sweats, or other complaints.  This does feel like his previous episodes to him.  No urinary complaints.  Denies other drugs.  His other diagnoses have included bilateral otomycosis, acute hypoxic and hypercapnic respiratory failure, systemic inflammatory response and protein calorie malnutrition.  No preceding illness in the last few days.  Denies systemic steroids or antibiotics recently.  No other complaints.    The history is provided by the patient. No  was used.     Review of patient's allergies indicates:  No Known Allergies  Past Medical History:   Diagnosis Date    Vapes nicotine containing substance      Past Surgical History:   Procedure Laterality Date    INCISION AND DRAINAGE OF ABSCESS      scalp     Family History   Problem Relation Age of Onset    No Known Problems Mother     Ulcerative colitis Father      Social History     Tobacco Use    Smoking status: Every Day     Types: Vaping with nicotine    Smokeless tobacco: Never   Substance Use Topics    Alcohol use: No    Drug use: Yes     Frequency: 1.0 times per week     Types: Marijuana     Review of Systems   Constitutional:  Negative for chills and fever.   HENT:  Negative for congestion, facial swelling, nosebleeds and sinus pressure.    Eyes:  Negative for pain and redness.   Respiratory:  Positive for cough, shortness of breath and wheezing. Negative for chest  tightness.    Cardiovascular:  Negative for chest pain, palpitations and leg swelling.   Gastrointestinal:  Negative for abdominal distention, abdominal pain, diarrhea, nausea and vomiting.   Endocrine: Negative for cold intolerance, polydipsia and polyphagia.   Genitourinary:  Negative for difficulty urinating, dysuria, frequency and hematuria.   Musculoskeletal:  Negative for arthralgias, back pain, myalgias and neck pain.   Skin:  Negative for color change and rash.   Neurological:  Negative for dizziness, weakness, numbness and headaches.   Hematological:  Negative for adenopathy. Does not bruise/bleed easily.   Psychiatric/Behavioral:  Negative for agitation and behavioral problems.    All other systems reviewed and are negative.      Physical Exam     Initial Vitals [01/04/24 0317]   BP Pulse Resp Temp SpO2   120/84 (!) 58 (!) 24 98.3 °F (36.8 °C) (!) 89 %      MAP       --         Physical Exam    Nursing note and vitals reviewed.  Constitutional: He appears well-developed and well-nourished. He is not diaphoretic. He appears distressed.   HENT:   Head: Normocephalic and atraumatic.   Mouth/Throat: Oropharynx is clear and moist. No oropharyngeal exudate.   Eyes: Conjunctivae and EOM are normal. Pupils are equal, round, and reactive to light. Right eye exhibits no discharge. Left eye exhibits no discharge. No scleral icterus.   Neck: Neck supple. No thyromegaly present. No tracheal deviation present. No JVD present.   Normal range of motion.  Cardiovascular:  Normal rate, regular rhythm and normal heart sounds.     Exam reveals no gallop and no friction rub.       No murmur heard.  Pulmonary/Chest: He is in respiratory distress. He has wheezes. He has no rhonchi. He has no rales. He exhibits no tenderness.   Tight, diffuse, symmetric bilateral expiratory wheezing with prolonged expiratory phase, moderate tachypnea, moderate dyspnea, decreased air entry throughout   Abdominal: Abdomen is soft. Bowel sounds are  normal. He exhibits no distension and no mass. There is no abdominal tenderness. There is no rebound and no guarding.   Musculoskeletal:         General: No tenderness or edema. Normal range of motion.      Cervical back: Normal range of motion and neck supple.     Lymphadenopathy:     He has no cervical adenopathy.   Neurological: He is alert and oriented to person, place, and time. He has normal strength. No cranial nerve deficit.   Skin: Skin is warm and dry. No rash noted. No erythema.   Psychiatric: He has a normal mood and affect. His behavior is normal. Judgment and thought content normal.         ED Course   Critical Care    Date/Time: 1/4/2024 5:35 AM    Performed by: Bart Bay MD  Authorized by: Bart Bay MD  Direct patient critical care time: 10 minutes  Additional history critical care time: 10 minutes  Ordering / reviewing critical care time: 10 minutes  Documentation critical care time: 10 minutes  Consulting other physicians critical care time: 5 minutes  Total critical care time (exclusive of procedural time) : 45 minutes  Critical care time was exclusive of separately billable procedures and treating other patients and teaching time.  Critical care was necessary to treat or prevent imminent or life-threatening deterioration of the following conditions: respiratory failure.  Critical care was time spent personally by me on the following activities: development of treatment plan with patient or surrogate, examination of patient, evaluation of patient's response to treatment, obtaining history from patient or surrogate, ordering and performing treatments and interventions, pulse oximetry, ordering and review of radiographic studies, ordering and review of laboratory studies, re-evaluation of patient's condition and review of old charts.        Labs Reviewed   CBC W/ AUTO DIFFERENTIAL - Abnormal; Notable for the following components:       Result Value    MCH 31.2 (*)     Eos # 2.1 (*)      Eosinophil % 19.1 (*)     All other components within normal limits   COMPREHENSIVE METABOLIC PANEL - Abnormal; Notable for the following components:    Alkaline Phosphatase 54 (*)     All other components within normal limits   ISTAT PROCEDURE - Abnormal; Notable for the following components:    POC PO2 57 (*)     POC BE 3 (*)     All other components within normal limits   ALCOHOL,MEDICAL (ETHANOL)   TROPONIN I   B-TYPE NATRIURETIC PEPTIDE   D DIMER, QUANTITATIVE   URINALYSIS, REFLEX TO URINE CULTURE   DRUG SCREEN PANEL, URINE EMERGENCY   POCT INFLUENZA A/B MOLECULAR   SARS-COV-2 RDRP GENE     EKG Readings: (Independently Interpreted)   Initial Reading: No STEMI. Rhythm: Sinus Tachycardia. Heart Rate: 111. Ectopy: No Ectopy.   Rightward axis       Imaging Results              X-Ray Chest AP Portable (Preliminary result)  Result time 01/04/24 05:22:21      Wet Read by Bart Bay MD (01/04/24 05:22:21, [facility], Emergency Medicine)    NAF                                     Medications   albuterol (PROVENTIL) 2.5 mg /3 mL (0.083 %) nebulizer solution (has no administration in time range)   albuterol-ipratropium 2.5 mg-0.5 mg/3 mL nebulizer solution 3 mL (3 mLs Nebulization Given 1/4/24 0346)   methylPREDNISolone sodium succinate injection 125 mg (125 mg Intravenous Given 1/4/24 0332)   lactated ringers bolus 500 mL (0 mLs Intravenous Stopped 1/4/24 0420)   lactated ringers infusion (1,000 mLs Intravenous New Bag 1/4/24 0348)   magnesium sulfate 2g in water 50mL IVPB (premix) (0 g Intravenous Stopped 1/4/24 0521)   EPINEPHrine (PF) injection 0.3 mg (0.3 mg Intramuscular Given 1/4/24 0339)   albuterol nebulizer solution 7.5 mg (7.5 mg Nebulization Given 1/4/24 0648)       5:23 AM Feels a little better, still with significant wheezing, heart rate in the 120s, oxygen saturation in the upper 80s on 3 L NC O2 when resting/ drifting off, able to raise it to upper 90s while awake and talking.   Will try BiPAP trial.  Facility of choice is [facility] - has been seen by [facility] recently.        Medical Decision Making  Problems Addressed:  Bronchospasm: acute illness or injury  Hypoxemia: acute illness or injury    Amount and/or Complexity of Data Reviewed  Labs: ordered. Decision-making details documented in ED Course.  Radiology: ordered and independent interpretation performed. Decision-making details documented in ED Course.  ECG/medicine tests: ordered and independent interpretation performed. Decision-making details documented in ED Course.    Risk  Prescription drug management.  Decision regarding hospitalization.      Additional MDM:   Differential Diagnosis:   Asthma exacerbation, other variant of reactive airways disease, tobacco abuse                         I discussed the patient's case with Dr. Fry, who accepts the patient for admission.      Admitting MD:  Dr. Fry  Admitting service:  Hospital Medicine   Admission type:  Inpatient med/surg            Clinical Impression:  Final diagnoses:  [R06.02] SOB (shortness of breath)  [J98.01] Bronchospasm  [R09.02] Hypoxemia  [Z72.0] Tobacco abuse  [J45.51] Severe persistent asthma with exacerbation  [U07.0] E-cigarette or vaping product use associated lung injury (EVALI) (Primary)  [J96.01] Acute respiratory failure with hypoxia          ED Disposition Condition    Admit Stable                Black Elise MD  01/04/24 0706

## 2024-01-05 DIAGNOSIS — J45.901 ASTHMA EXACERBATION: Primary | ICD-10-CM

## 2024-01-05 PROCEDURE — 99900035 HC TECH TIME PER 15 MIN (STAT)

## 2024-01-05 PROCEDURE — 27000221 HC OXYGEN, UP TO 24 HOURS

## 2024-01-05 PROCEDURE — 27000207 HC ISOLATION

## 2024-01-05 PROCEDURE — 11000001 HC ACUTE MED/SURG PRIVATE ROOM

## 2024-01-05 PROCEDURE — S4991 NICOTINE PATCH NONLEGEND: HCPCS | Performed by: HOSPITALIST

## 2024-01-05 PROCEDURE — 25000003 PHARM REV CODE 250: Performed by: HOSPITALIST

## 2024-01-05 PROCEDURE — 94761 N-INVAS EAR/PLS OXIMETRY MLT: CPT

## 2024-01-05 PROCEDURE — 25000242 PHARM REV CODE 250 ALT 637 W/ HCPCS: Performed by: FAMILY MEDICINE

## 2024-01-05 PROCEDURE — 94640 AIRWAY INHALATION TREATMENT: CPT

## 2024-01-05 PROCEDURE — 63600175 PHARM REV CODE 636 W HCPCS: Performed by: FAMILY MEDICINE

## 2024-01-05 RX ORDER — IPRATROPIUM BROMIDE AND ALBUTEROL SULFATE 2.5; .5 MG/3ML; MG/3ML
3 SOLUTION RESPIRATORY (INHALATION) EVERY 4 HOURS
Status: DISCONTINUED | OUTPATIENT
Start: 2024-01-05 | End: 2024-01-05

## 2024-01-05 RX ORDER — MUPIROCIN 20 MG/G
OINTMENT TOPICAL 2 TIMES DAILY
Status: DISCONTINUED | OUTPATIENT
Start: 2024-01-06 | End: 2024-01-06 | Stop reason: HOSPADM

## 2024-01-05 RX ORDER — IBUPROFEN 200 MG
1 TABLET ORAL
Status: DISCONTINUED | OUTPATIENT
Start: 2024-01-06 | End: 2024-01-06 | Stop reason: HOSPADM

## 2024-01-05 RX ORDER — IPRATROPIUM BROMIDE AND ALBUTEROL SULFATE 2.5; .5 MG/3ML; MG/3ML
3 SOLUTION RESPIRATORY (INHALATION) EVERY 4 HOURS
Status: DISCONTINUED | OUTPATIENT
Start: 2024-01-05 | End: 2024-01-06 | Stop reason: HOSPADM

## 2024-01-05 RX ADMIN — METHYLPREDNISOLONE SODIUM SUCCINATE 40 MG: 125 INJECTION, POWDER, FOR SOLUTION INTRAMUSCULAR; INTRAVENOUS at 01:01

## 2024-01-05 RX ADMIN — IPRATROPIUM BROMIDE AND ALBUTEROL SULFATE 3 ML: 2.5; .5 SOLUTION RESPIRATORY (INHALATION) at 11:01

## 2024-01-05 RX ADMIN — NICOTINE 1 PATCH: 21 PATCH, EXTENDED RELEASE TRANSDERMAL at 11:01

## 2024-01-05 RX ADMIN — IPRATROPIUM BROMIDE AND ALBUTEROL SULFATE 3 ML: 2.5; .5 SOLUTION RESPIRATORY (INHALATION) at 04:01

## 2024-01-05 RX ADMIN — IPRATROPIUM BROMIDE AND ALBUTEROL SULFATE 3 ML: 2.5; .5 SOLUTION RESPIRATORY (INHALATION) at 03:01

## 2024-01-05 RX ADMIN — IPRATROPIUM BROMIDE AND ALBUTEROL SULFATE 3 ML: 2.5; .5 SOLUTION RESPIRATORY (INHALATION) at 07:01

## 2024-01-05 RX ADMIN — NICOTINE 1 PATCH: 21 PATCH, EXTENDED RELEASE TRANSDERMAL at 08:01

## 2024-01-05 RX ADMIN — METHYLPREDNISOLONE SODIUM SUCCINATE 40 MG: 125 INJECTION, POWDER, FOR SOLUTION INTRAMUSCULAR; INTRAVENOUS at 07:01

## 2024-01-05 RX ADMIN — METHYLPREDNISOLONE SODIUM SUCCINATE 40 MG: 125 INJECTION, POWDER, FOR SOLUTION INTRAMUSCULAR; INTRAVENOUS at 09:01

## 2024-01-05 NOTE — PLAN OF CARE
[facility]  Initial Discharge Assessment       Primary Care Provider: Fide Steen FNP-C    Admission Diagnosis: Hypoxemia [R09.02]  Bronchospasm [J98.01]  Tobacco abuse [Z72.0]  SOB (shortness of breath) [R06.02]  Severe persistent asthma with exacerbation [J45.51]  Acute respiratory failure with hypoxia [J96.01]  E-cigarette or vaping product use associated lung injury (EVALI) [U07.0]    Admission Date: 1/4/2024  Expected Discharge Date: per attending         Payor: MEDICAID / Plan: [place]UE [place] / Product Type: Managed Medicaid /     Extended Emergency Contact Information  Primary Emergency Contact: SHEILACHRISTIANO  Mobile Phone: [phone]  Relation: Mother  Secondary Emergency Contact: GEOVANY BISHOP  Mobile Phone: [phone]  Relation: Grandparent  Preferred language: English   needed? No    Discharge Plan A: Home with family         [facility] [place] [address]  [address]  [address]  [place] [address]  Phone: [phone] Fax: [phone]      Initial Assessment (most recent)       Adult Discharge Assessment - 01/05/24 1151          Discharge Assessment    Assessment Type Discharge Planning Assessment     Confirmed/corrected address, phone number and insurance Yes     Confirmed Demographics Correct on Facesheet     Source of Information family     Communicated HAZEL with patient/caregiver Date not available/Unable to determine     Reason For Admission hypoxemia     People in Home parent(s)     Do you expect to return to your current living situation? Yes     Do you have help at home or someone to help you manage your care at home? Yes     Who are your caregiver(s) and their phone number(s)? mom     Prior to hospitilization cognitive status: Alert/Oriented     Current cognitive status: Alert/Oriented     Equipment Currently Used at Home none     Readmission within 30 days? No     Patient currently being followed by  outpatient case management? No     Do you currently have service(s) that help you manage your care at home? No     Do you take prescription medications? Yes     Do you have prescription coverage? Yes     Coverage medicaid     Do you have any problems affording any of your prescribed medications? No     Is the patient taking medications as prescribed? yes     Who is going to help you get home at discharge? mom     How do you get to doctors appointments? family or friend will provide     Are you on dialysis? No     Do you take coumadin? No     Discharge Plan A Home with family

## 2024-01-05 NOTE — PLAN OF CARE
Problem: Adult Inpatient Plan of Care  Goal: Plan of Care Review  Outcome: Ongoing, Progressing  Goal: Patient-Specific Goal (Individualized)  Outcome: Ongoing, Progressing  Goal: Absence of Hospital-Acquired Illness or Injury  Outcome: Ongoing, Progressing  Goal: Optimal Comfort and Wellbeing  Outcome: Ongoing, Progressing  Goal: Readiness for Transition of Care  Outcome: Ongoing, Progressing     Problem: Infection  Goal: Absence of Infection Signs and Symptoms  Outcome: Ongoing, Progressing     Problem: Breathing Pattern Ineffective  Goal: Effective Breathing Pattern  Outcome: Ongoing, Progressing

## 2024-01-05 NOTE — HOSPITAL COURSE
Patient was admitted for asthma exacerbation secondary to smoking.  He was started on IV steroids and breathing treatment.  Supplemental O2 applied.  O2 weaned down.  Home O2 eval performed and patient did not qualify.  Will continue prednisone 20 mg b.i.d..  Continue home albuterol and Dulera.  Patient discharged home.

## 2024-01-05 NOTE — PLAN OF CARE
Patient had 1 episode of shortness of breath and wheezing tonight, called respiratory this morning to assess and give breathing treatment, KIANA, patient on tele [id number] NSR@91, patient on 3L/NC, fall precautions in place, chart check completed

## 2024-01-05 NOTE — PROGRESS NOTES
[facility]  [facility] [place]  Progress Note    Patient Name: Rohan Chin  MRN: [id number]  Patient Class: IP- Inpatient   Admission Date: 1/4/2024  Length of Stay: 1 days  Attending Physician: Aayush Fry MD  Primary Care Provider: Fide Steen FNP-C        Subjective:     Principal Problem:Asthma exacerbation        HPI:  Patient is a 19 y/o  male with a Pmh of asthma who presents to [facility] ED for sob. He was recently diagnosed with asthma a few months ago and has seen pulm outpatient. Currently on albuterol rescue and dulera inhaler at home. He states he use to vape chronically however has been trying to switch and started smoking cigarettes instead. He reports sob and wheezing not controlled with home inhalers. Denies any sick contacts, fever, chills.     In the ED, patient was started on bipap due to work of breathing. He was given epi, mag sulfate, solumedrol, duonebs. He was weaned down to NC. Chest xray unremarkable. UDS positive for opioids and thc.           Overview/Hospital Course:  1/5:  No acute issues overnight, patient still requires nasal cannula, wheezing still noted throughout.  Will continue steroids and breathing treatments.    Interval History:  Patient denies any issues overnight.    Review of Systems   Constitutional:  Negative for fatigue and fever.   HENT:  Negative for sinus pressure.    Eyes:  Negative for visual disturbance.   Respiratory:  Positive for shortness of breath and wheezing.    Cardiovascular:  Negative for chest pain.   Gastrointestinal:  Negative for nausea and vomiting.   Genitourinary:  Negative for difficulty urinating.   Musculoskeletal:  Negative for back pain.   Skin:  Negative for rash.   Neurological:  Negative for headaches.   Psychiatric/Behavioral:  Negative for confusion.      Objective:     Vital Signs (Most Recent):  Temp: 97.6 °F (36.4 °C) (01/05/24 0745)  Pulse: 78 (01/05/24 0900)  Resp: 18 (01/05/24 0900)  BP: (!) 95/49  (01/05/24 0745)  SpO2: 96 % (01/05/24 0900) Vital Signs (24h Range):  Temp:  [97.3 °F (36.3 °C)-98.4 °F (36.9 °C)] 97.6 °F (36.4 °C)  Pulse:  [] 78  Resp:  [13-24] 18  SpO2:  [93 %-97 %] 96 %  BP: ()/(49-67) 95/49     Weight: 57.9 kg (127 lb 10.3 oz)  Body mass index is 19.41 kg/m².    Intake/Output Summary (Last 24 hours) at 1/5/2024 0955  Last data filed at 1/4/2024 2359  Gross per 24 hour   Intake 1240 ml   Output --   Net 1240 ml         Physical Exam  Constitutional:       General: He is not in acute distress.     Appearance: He is well-developed. He is not diaphoretic.   HENT:      Head: Normocephalic and atraumatic.   Eyes:      Pupils: Pupils are equal, round, and reactive to light.   Cardiovascular:      Rate and Rhythm: Normal rate and regular rhythm.      Heart sounds: Normal heart sounds. No murmur heard.     No friction rub. No gallop.   Pulmonary:      Effort: Pulmonary effort is normal. No respiratory distress.      Breath sounds: No stridor. Wheezing (inspiratory and expiratory) present. No rales.   Abdominal:      General: Bowel sounds are normal. There is no distension.      Palpations: Abdomen is soft. There is no mass.      Tenderness: There is no abdominal tenderness. There is no guarding.   Skin:     General: Skin is warm.      Findings: No erythema.   Neurological:      Mental Status: He is alert and oriented to person, place, and time.             Significant Labs: All pertinent labs within the past 24 hours have been reviewed.    Significant Imaging: I have reviewed all pertinent imaging results/findings within the past 24 hours.    Assessment/Plan:      * Asthma exacerbation  Secondary to smoking  Will cont solumedrol   Duonebs   Wean o2 as tolerated  Pt still with persistent expiratory and inspiratory wheezes  Throughout today on exam     1/5:   Patient is still wheezing   Continue breathing treatments  Continue steroids   Possible DC in the next 24-48 hours        Tobacco  dependence  Dangers of cigarette smoking were reviewed with patient in detail. Patient was Counseled for 3-10 minutes. Nicotine replacement options were discussed. Nicotine replacement was discussed- will order nicotine patch     Hypoxia  Secondary to asthma exacerbation  Wean o2 as tolerated  Cont treatment as above        VTE Risk Mitigation (From admission, onward)      None            Discharge Planning   HAZEL:      Code Status: Full Code   Is the patient medically ready for discharge?:     Reason for patient still in hospital (select all that apply): Patient trending condition                     Aayush Fry MD  Department of Hospital Medicine   [facility]

## 2024-01-05 NOTE — SUBJECTIVE & OBJECTIVE
Interval History:  Patient denies any issues overnight.    Review of Systems   Constitutional:  Negative for fatigue and fever.   HENT:  Negative for sinus pressure.    Eyes:  Negative for visual disturbance.   Respiratory:  Positive for shortness of breath and wheezing.    Cardiovascular:  Negative for chest pain.   Gastrointestinal:  Negative for nausea and vomiting.   Genitourinary:  Negative for difficulty urinating.   Musculoskeletal:  Negative for back pain.   Skin:  Negative for rash.   Neurological:  Negative for headaches.   Psychiatric/Behavioral:  Negative for confusion.      Objective:     Vital Signs (Most Recent):  Temp: 97.6 °F (36.4 °C) (01/05/24 0745)  Pulse: 78 (01/05/24 0900)  Resp: 18 (01/05/24 0900)  BP: (!) 95/49 (01/05/24 0745)  SpO2: 96 % (01/05/24 0900) Vital Signs (24h Range):  Temp:  [97.3 °F (36.3 °C)-98.4 °F (36.9 °C)] 97.6 °F (36.4 °C)  Pulse:  [] 78  Resp:  [13-24] 18  SpO2:  [93 %-97 %] 96 %  BP: ()/(49-67) 95/49     Weight: 57.9 kg (127 lb 10.3 oz)  Body mass index is 19.41 kg/m².    Intake/Output Summary (Last 24 hours) at 1/5/2024 0955  Last data filed at 1/4/2024 2359  Gross per 24 hour   Intake 1240 ml   Output --   Net 1240 ml         Physical Exam  Constitutional:       General: He is not in acute distress.     Appearance: He is well-developed. He is not diaphoretic.   HENT:      Head: Normocephalic and atraumatic.   Eyes:      Pupils: Pupils are equal, round, and reactive to light.   Cardiovascular:      Rate and Rhythm: Normal rate and regular rhythm.      Heart sounds: Normal heart sounds. No murmur heard.     No friction rub. No gallop.   Pulmonary:      Effort: Pulmonary effort is normal. No respiratory distress.      Breath sounds: No stridor. Wheezing (inspiratory and expiratory) present. No rales.   Abdominal:      General: Bowel sounds are normal. There is no distension.      Palpations: Abdomen is soft. There is no mass.      Tenderness: There is no  abdominal tenderness. There is no guarding.   Skin:     General: Skin is warm.      Findings: No erythema.   Neurological:      Mental Status: He is alert and oriented to person, place, and time.             Significant Labs: All pertinent labs within the past 24 hours have been reviewed.    Significant Imaging: I have reviewed all pertinent imaging results/findings within the past 24 hours.

## 2024-01-05 NOTE — ASSESSMENT & PLAN NOTE
Secondary to smoking  Will cont solumedrol   Duonebs   Wean o2 as tolerated  Pt still with persistent expiratory and inspiratory wheezes  Throughout today on exam     1/5:   Patient is still wheezing   Continue breathing treatments  Continue steroids   Possible DC in the next 24-48 hours

## 2024-01-06 VITALS
SYSTOLIC BLOOD PRESSURE: 119 MMHG | BODY MASS INDEX: 19.34 KG/M2 | HEART RATE: 105 BPM | DIASTOLIC BLOOD PRESSURE: 69 MMHG | WEIGHT: 127.63 LBS | RESPIRATION RATE: 20 BRPM | OXYGEN SATURATION: 95 % | TEMPERATURE: 98 F | HEIGHT: 68 IN

## 2024-01-06 PROCEDURE — 25000003 PHARM REV CODE 250: Performed by: FAMILY MEDICINE

## 2024-01-06 PROCEDURE — 94618 PULMONARY STRESS TESTING: CPT

## 2024-01-06 PROCEDURE — 63600175 PHARM REV CODE 636 W HCPCS: Performed by: FAMILY MEDICINE

## 2024-01-06 PROCEDURE — 25000242 PHARM REV CODE 250 ALT 637 W/ HCPCS: Performed by: FAMILY MEDICINE

## 2024-01-06 PROCEDURE — 94761 N-INVAS EAR/PLS OXIMETRY MLT: CPT

## 2024-01-06 PROCEDURE — S4991 NICOTINE PATCH NONLEGEND: HCPCS | Performed by: FAMILY MEDICINE

## 2024-01-06 PROCEDURE — 94640 AIRWAY INHALATION TREATMENT: CPT

## 2024-01-06 PROCEDURE — 27000221 HC OXYGEN, UP TO 24 HOURS

## 2024-01-06 RX ORDER — PREDNISONE 20 MG/1
20 TABLET ORAL 2 TIMES DAILY
Qty: 10 TABLET | Refills: 0 | Status: SHIPPED | OUTPATIENT
Start: 2024-01-06 | End: 2024-01-11

## 2024-01-06 RX ADMIN — IPRATROPIUM BROMIDE AND ALBUTEROL SULFATE 3 ML: 2.5; .5 SOLUTION RESPIRATORY (INHALATION) at 03:01

## 2024-01-06 RX ADMIN — IPRATROPIUM BROMIDE AND ALBUTEROL SULFATE 3 ML: 2.5; .5 SOLUTION RESPIRATORY (INHALATION) at 08:01

## 2024-01-06 RX ADMIN — METHYLPREDNISOLONE SODIUM SUCCINATE 40 MG: 125 INJECTION, POWDER, FOR SOLUTION INTRAMUSCULAR; INTRAVENOUS at 05:01

## 2024-01-06 RX ADMIN — MUPIROCIN: 20 OINTMENT TOPICAL at 09:01

## 2024-01-06 RX ADMIN — IPRATROPIUM BROMIDE AND ALBUTEROL SULFATE 3 ML: 2.5; .5 SOLUTION RESPIRATORY (INHALATION) at 10:01

## 2024-01-06 NOTE — PLAN OF CARE
Remains injury free. Tolerating diet. Denies c/o pain. Denies shortness of breath. Stable for discharge.

## 2024-01-06 NOTE — DISCHARGE SUMMARY
[facility]  Hospital Medicine  Discharge Summary      Patient Name: Rohan Chin  MRN: [id number]  JASON: [id number]  Patient Class: IP- Inpatient  Admission Date: 1/4/2024  Hospital Length of Stay: 2 days  Discharge Date and Time:  01/06/2024 10:57 AM  Attending Physician: Aayush Fry MD   Discharging Provider: Aayush Fry MD  Primary Care Provider: Fide Steen FNP-C    Primary Care Team: Networked reference to record PCT     HPI:   Patient is a 19 y/o  male with a Pmh of asthma who presents to [facility] ED for sob. He was recently diagnosed with asthma a few months ago and has seen pulm outpatient. Currently on albuterol rescue and dulera inhaler at home. He states he use to vape chronically however has been trying to switch and started smoking cigarettes instead. He reports sob and wheezing not controlled with home inhalers. Denies any sick contacts, fever, chills.     In the ED, patient was started on bipap due to work of breathing. He was given epi, mag sulfate, solumedrol, duonebs. He was weaned down to NC. Chest xray unremarkable. UDS positive for opioids and thc.           * No surgery found *      Hospital Course:   Patient was admitted for asthma exacerbation secondary to smoking.  He was started on IV steroids and breathing treatment.  Supplemental O2 applied.  O2 weaned down.  Home O2 eval performed and patient did not qualify.  Will continue prednisone 20 mg b.i.d..  Continue home albuterol and Dulera.  Patient discharged home.         Goals of Care Treatment Preferences:  Code Status: Full Code      Consults:     No new Assessment & Plan notes have been filed under this hospital service since the last note was generated.  Service: Hospital Medicine    Final Active Diagnoses:    Diagnosis Date Noted POA    PRINCIPAL PROBLEM:  Asthma exacerbation [J45.901] 01/04/2024 Yes    Tobacco dependence [F17.200] 01/04/2024 Yes    Hypoxia [R09.02] 08/25/2023 Yes      Problems Resolved During  this Admission:       Discharged Condition: good    Disposition: Home or Self Care    Follow Up:   Follow-up Information       Fide Steen, FNP-C Follow up in 1 week(s).    Specialty: Family Medicine  Contact information:  [address] [facility]  [place] [id number] [phone]                           Patient Instructions:      Reason for not Ordering Smoking Cessation Referral     Order Specific Question Answer Comments   Reason for not ordering: Patient refused      Reason for not Prescribing Nicotine Replacement     Order Specific Question Answer Comments   Reason for not Prescribing: Patient refused        Significant Diagnostic Studies: N/A    Pending Diagnostic Studies:       None           Medications:  Reconciled Home Medications:      Medication List        START taking these medications      predniSONE 20 MG tablet  Commonly known as: DELTASONE  Take 1 tablet (20 mg total) by mouth 2 (two) times daily. for 5 days            CONTINUE taking these medications      AEROCHAMBER MV  Generic drug: inhalation spacing device  For use with albuterol/Dulera inhalers     albuterol 90 mcg/actuation inhaler  Commonly known as: PROVENTIL/VENTOLIN HFA  Inhale into the lungs.     DULERA 100-5 mcg/actuation Hfaa  Generic drug: mometasone-formoterol  Inhale into the lungs.            STOP taking these medications      ibuprofen 600 MG tablet  Commonly known as: ADVIL,MOTRIN     mupirocin 2 % ointment  Commonly known as: BACTROBAN     sulfamethoxazole-trimethoprim 400-80mg 400-80 mg per tablet  Commonly known as: BACTRIM,SEPTRA              Indwelling Lines/Drains at time of discharge:   Lines/Drains/Airways       None                   Time spent on the discharge of patient: 36 minutes         Aayush Fry MD  Department of Hospital Medicine  [facility]

## 2024-01-06 NOTE — PLAN OF CARE
Patient resting, no distress noted, FABS, patient hasn't had any exacerbations overnight, new IV overnight as the patient pulled his previous IV out, new Nicotine patch placed as well as patient removed the previous nicotine patch, fall precautions in place, chart check completed.

## 2024-01-06 NOTE — CARE UPDATE
01/06/24 1048   Home Oxygen Qualification   $ Home O2 Qualification Pulmonary Stress Test/6 min walk   Room Air SpO2 At Rest 96 %   Room Air SpO2 During Ambulation 92 %   SpO2 Post Ambulation 94 %   Post Ambulation Heart Rate 115 bpm   Home O2 Eval Comments pt does not need oxygen at this time

## 2024-01-06 NOTE — PROGRESS NOTES
Respiratory Therapist attempted to complete home O2 eval.  Pt refused to get out of bed.  Unable to complete eval.

## 2024-01-06 NOTE — PLAN OF CARE
[facility]  Discharge Final Note    Primary Care Provider: Fide Steen FNP-C    Expected Discharge Date: 1/6/2024    Final Discharge Note (most recent)       Final Note - 01/06/24 1126          Final Note    Assessment Type Final Discharge Note     Anticipated Discharge Disposition Home or Self Care     Hospital Resources/Appts/Education Provided Appointments scheduled and added to AVS                     Important Message from Medicare             Contact Info       Fide Steen FNP-C   Specialty: Family Medicine   Relationship: PCP - General    [address] [place] [address] FAMILY MEDICINE  [facility] [address]   Phone: [phone]       Next Steps: Follow up in 1 week(s)

## 2024-03-26 ENCOUNTER — HOSPITAL ENCOUNTER (EMERGENCY)
Facility: HOSPITAL | Age: 19
Discharge: HOME | End: 2024-03-26
Attending: EMERGENCY MEDICINE
Payer: MEDICAID

## 2024-03-26 VITALS
WEIGHT: 125 LBS | DIASTOLIC BLOOD PRESSURE: 65 MMHG | HEART RATE: 101 BPM | TEMPERATURE: 98 F | OXYGEN SATURATION: 96 % | BODY MASS INDEX: 19.01 KG/M2 | RESPIRATION RATE: 13 BRPM | SYSTOLIC BLOOD PRESSURE: 111 MMHG

## 2024-03-26 DIAGNOSIS — T40.601A OPIATE OVERDOSE, ACCIDENTAL OR UNINTENTIONAL, INITIAL ENCOUNTER: Primary | ICD-10-CM

## 2024-03-26 PROCEDURE — 99283 EMERGENCY DEPT VISIT LOW MDM: CPT | Mod: ER

## 2024-03-26 RX ORDER — NALOXONE HYDROCHLORIDE 4 MG/.1ML
SPRAY NASAL
Qty: 1 EACH | Refills: 11 | Status: SHIPPED | OUTPATIENT
Start: 2024-03-26

## 2024-03-26 NOTE — ED PROVIDER NOTES
"Encounter Date: 3/26/2024       History     Chief Complaint   Patient presents with    Drug Overdose     Snorted unknown substance, possible oxycodone" pta, found down and "blue", 8mg Narcan intranasal per first responders      Present after snorting an unknown amount of oxycodone with girlfriend.  Apparently patient's family member found him unresponsive and called 911.  He was administered narcan on scene.      The history is provided by the patient and the EMS personnel.     Review of patient's allergies indicates:  No Known Allergies  Past Medical History:   Diagnosis Date    Acute respiratory failure     Asthma with acute exacerbation     Hypercapnia     Lactic acidosis     Marijuana use     Otomycosis of both ears     Severe protein-calorie malnutrition     SIRS (systemic inflammatory response syndrome)     Thrombocytosis     Vapes nicotine containing substance      Past Surgical History:   Procedure Laterality Date    INCISION AND DRAINAGE OF ABSCESS      scalp     Family History   Problem Relation Age of Onset    No Known Problems Mother     Ulcerative colitis Father      Social History     Tobacco Use    Smoking status: Every Day     Current packs/day: 0.25     Types: Cigarettes    Smokeless tobacco: Never   Substance Use Topics    Alcohol use: No    Drug use: Yes     Frequency: 1.0 times per week     Types: Marijuana     Review of Systems   Constitutional:  Negative for fever.   HENT:  Negative for sore throat.    Respiratory:  Negative for shortness of breath.    Cardiovascular:  Negative for chest pain.   Gastrointestinal:  Negative for nausea.   Genitourinary:  Negative for dysuria.   Musculoskeletal:  Negative for back pain.   Skin:  Negative for rash.   Neurological:  Negative for weakness.   Hematological:  Does not bruise/bleed easily.       Physical Exam     Initial Vitals [03/26/24 1225]   BP Pulse Resp Temp SpO2   116/83 (!) 115 (!) 22 98.1 °F (36.7 °C) 100 %      MAP       --         Physical " Exam    Nursing note and vitals reviewed.  Constitutional: He appears well-developed and well-nourished. No distress.   HENT:   Head: Normocephalic and atraumatic.   Mouth/Throat: Oropharynx is clear and moist.   Eyes: Conjunctivae and EOM are normal. Pupils are equal, round, and reactive to light.   Neck: Neck supple.   Normal range of motion.  Cardiovascular:  Normal rate, regular rhythm and normal heart sounds.     Exam reveals no gallop and no friction rub.       No murmur heard.  Pulmonary/Chest: Breath sounds normal. No respiratory distress. He has no wheezes. He has no rhonchi. He has no rales.   Abdominal: Abdomen is soft. Bowel sounds are normal. He exhibits no distension and no mass. There is no abdominal tenderness. There is no rebound and no guarding.   Musculoskeletal:         General: No edema. Normal range of motion.      Cervical back: Normal range of motion and neck supple.     Neurological: He is alert and oriented to person, place, and time. He has normal strength.   Skin: Skin is warm and dry. No rash noted.   Psychiatric: He has a normal mood and affect. Thought content normal.         ED Course   Procedures  Labs Reviewed - No data to display       Imaging Results    None          Medications - No data to display  Medical Decision Making  Present after snorting an unknown amount of oxycodone with girlfriend.  Apparently patient's family member found him unresponsive and called 911.  He was administered narcan on scene.    DDx: opiate overdose, drug abuse    Amount and/or Complexity of Data Reviewed  Discussion of management or test interpretation with external provider(s): Patient has been watched for several hours.  Has not received any additional doses of narcan while in the department.  Sats have remained above 96%.  Patient is stable and ready for discharge home.                                       Clinical Impression:  Final diagnoses:  [T40.601A] Opiate overdose, accidental or  unintentional, initial encounter (Primary)          ED Disposition Condition    Discharge Stable          ED Prescriptions       Medication Sig Dispense Start Date End Date Auth. Provider    naloxone (NARCAN) 4 mg/actuation Spry 4mg by nasal route as needed for opioid overdose; may repeat every 2-3 minutes in alternating nostrils until medical help arrives. Call 911 1 each 3/26/2024 -- Walker Penn MD          Follow-up Information       Follow up With Specialties Details Why Contact Info    Fide Steen, FNP-C Family Medicine   [address] [place] [id number]  [phone]               Walker Penn MD  03/26/24 8683

## 2024-09-07 ENCOUNTER — HOSPITAL ENCOUNTER (EMERGENCY)
Facility: HOSPITAL | Age: 19
Discharge: HOME | End: 2024-09-07
Attending: EMERGENCY MEDICINE
Payer: MEDICAID

## 2024-09-07 VITALS
WEIGHT: 116.88 LBS | HEIGHT: 67 IN | OXYGEN SATURATION: 96 % | DIASTOLIC BLOOD PRESSURE: 84 MMHG | RESPIRATION RATE: 20 BRPM | BODY MASS INDEX: 18.35 KG/M2 | SYSTOLIC BLOOD PRESSURE: 126 MMHG | TEMPERATURE: 98 F | HEART RATE: 108 BPM

## 2024-09-07 DIAGNOSIS — H60.93 OTITIS EXTERNA OF BOTH EARS, UNSPECIFIED CHRONICITY, UNSPECIFIED TYPE: Primary | ICD-10-CM

## 2024-09-07 PROCEDURE — 99283 EMERGENCY DEPT VISIT LOW MDM: CPT | Mod: ER

## 2024-09-07 RX ORDER — CIPROFLOXACIN AND DEXAMETHASONE 3; 1 MG/ML; MG/ML
4 SUSPENSION/ DROPS AURICULAR (OTIC) 2 TIMES DAILY
Qty: 7.5 ML | Refills: 0 | Status: SHIPPED | OUTPATIENT
Start: 2024-09-07

## 2024-09-07 NOTE — DISCHARGE INSTRUCTIONS
Regarding OTITIS EXTERNA, I recommended the use of ibuprofen and/or acetaminophen for management of pain or fever and the use of heat (utilizing a warm, moist washcloth on the ear to decrease pain for 15-20 minutes every 4 hours as needed) and/or ice (to help decrease swelling and pain utilizing an ice pack applied to the ear for 15-20 minutes every 4 hours as needed) to decrease pain.  Apply medications as directed.  Avoid swimming and keep ears completely dry for 3-4 weeks.  Reiterated the importance of following up with primary care provider, especially if the pain gets worse or persist even after treatment; ear is tender or swollen; develop nausea, vomiting, or diarrhea; notice fluid draining from ear; hearing loss develops; there is any change in mental or neurologic status; or have any questions regarding the management and treatment of otitis externa.  Also discussed importance of returning to the emergency department for febrile seizures, intractable fever, stiff neck, or difficulty breathing.

## 2024-09-07 NOTE — ED PROVIDER NOTES
Encounter Date: 9/7/2024       History     Chief Complaint   Patient presents with    Otalgia     Pt seen for otalgia 2 days ago and rx some otic abx but pt unsure what the name is.  He's used them once and does not think they are working. Came to ER for further evaluation and treatment.     The history is provided by the patient.   Otalgia  Pertinent negatives include no sore throat and no rash.     Review of patient's allergies indicates:  No Known Allergies  Past Medical History:   Diagnosis Date    Acute respiratory failure     Asthma with acute exacerbation     Hypercapnia     Lactic acidosis     Marijuana use     Otomycosis of both ears     Severe protein-calorie malnutrition     SIRS (systemic inflammatory response syndrome)     Thrombocytosis     Vapes nicotine containing substance      Past Surgical History:   Procedure Laterality Date    INCISION AND DRAINAGE OF ABSCESS      scalp     Family History   Problem Relation Name Age of Onset    No Known Problems Mother      Ulcerative colitis Father       Social History     Tobacco Use    Smoking status: Every Day     Current packs/day: 0.25     Types: Cigarettes    Smokeless tobacco: Never   Substance Use Topics    Alcohol use: No    Drug use: Yes     Frequency: 1.0 times per week     Types: Marijuana     Review of Systems   Constitutional:  Negative for fever.   HENT:  Positive for ear pain. Negative for sore throat.    Respiratory:  Negative for shortness of breath.    Cardiovascular:  Negative for chest pain.   Gastrointestinal:  Negative for nausea.   Genitourinary:  Negative for dysuria.   Musculoskeletal:  Negative for back pain.   Skin:  Negative for rash.   Neurological:  Negative for weakness.   Hematological:  Does not bruise/bleed easily.   All other systems reviewed and are negative.      Physical Exam     Initial Vitals [09/07/24 0229]   BP Pulse Resp Temp SpO2   126/84 108 20 98.1 °F (36.7 °C) 96 %      MAP       --         Physical  "Exam    Nursing note and vitals reviewed.  Constitutional: He appears well-developed and well-nourished. He is not diaphoretic. No distress.   HENT:   Head: Normocephalic and atraumatic.   Right Ear: Hearing normal. There is swelling and tenderness.   Left Ear: Hearing normal. There is swelling and tenderness.   Eyes: EOM are normal. Pupils are equal, round, and reactive to light. No scleral icterus.   Neck: Neck supple. No thyromegaly present.   Normal range of motion.  Cardiovascular:  Normal rate, regular rhythm, normal heart sounds and intact distal pulses.     Exam reveals no gallop and no friction rub.       No murmur heard.  Pulmonary/Chest: Breath sounds normal. No respiratory distress. He has no wheezes. He has no rhonchi. He exhibits no tenderness.   Abdominal: Abdomen is soft. Bowel sounds are normal. He exhibits no distension. There is no abdominal tenderness. There is no rebound and no guarding.   Musculoskeletal:         General: No tenderness or edema. Normal range of motion.      Cervical back: Normal range of motion and neck supple.     Lymphadenopathy:     He has no cervical adenopathy.   Neurological: He is alert and oriented to person, place, and time. He has normal strength. No cranial nerve deficit or sensory deficit.   Skin: Skin is warm and dry.   Psychiatric: He has a normal mood and affect. His behavior is normal. Judgment and thought content normal.         ED Course   Procedures  Labs Reviewed   HIV 1 / 2 ANTIBODY   HEPATITIS C ANTIBODY   HEP C VIRUS HOLD SPECIMEN          Imaging Results    None          Medications - No data to display  Medical Decision Making  Risk  Risk Details: Ddx: otitis externa, otitis media, infection, fb                  Vitals:    09/07/24 0229   BP: 126/84   Pulse: 108   Resp: 20   Temp: 98.1 °F (36.7 °C)   TempSrc: Oral   SpO2: 96%   Weight: 53 kg (116 lb 13.5 oz)   Height: 5' 7" (1.702 m)             Imaging Results    None         Medications - No data to " display    3:10 AM - Re-evaluation: The patient is resting comfortably and is in no acute distress. He states that his symptoms have improved after treatment within ER. Discussed shared treatment plan, specific conditions for return, and importance of follow up with patient and family.  Advised close f/u c pcp within one week and to return to ER if any issues arise.  He understands and agrees with the plan as discussed. Answered  his questions at this time. He has remained hemodynamically stable throughout the ED course and is appropriate for discharge home.     Regarding OTITIS EXTERNA, I recommended the use of ibuprofen and/or acetaminophen for management of pain or fever and the use of heat (utilizing a warm, moist washcloth on the ear to decrease pain for 15-20 minutes every 4 hours as needed) and/or ice (to help decrease swelling and pain utilizing an ice pack applied to the ear for 15-20 minutes every 4 hours as needed) to decrease pain.  Apply medications as directed.  Avoid swimming and keep ears completely dry for 3-4 weeks.  Reiterated the importance of following up with primary care provider, especially if the pain gets worse or persist even after treatment; ear is tender or swollen; develop nausea, vomiting, or diarrhea; notice fluid draining from ear; hearing loss develops; there is any change in mental or neurologic status; or have any questions regarding the management and treatment of otitis externa.  Also discussed importance of returning to the emergency department for febrile seizures, intractable fever, stiff neck, or difficulty breathing.     Pre-hypertension/Hypertension: The pt has been informed that they may have pre-hypertension or hypertension based on a blood pressure reading in the ED. I recommend that the pt call the PCP listed on their discharge instructions or a physician of their choice this week to arrange f/u for further evaluation of possible pre-hypertension or hypertension.      Rohan Chin was given a handout which discussed their disease process, precautions, and instructions for follow-up and therapy.     Follow-up Information       Fide Steen, [place]. Schedule an appointment as soon as possible for a visit in 1 week.    Specialty: Family Medicine  Contact information:  [address] [facility] FAMILY MEDICINE  [facility] [address] [phone]               [facility] - Emergency Dept.    Specialty: Emergency Medicine  Why: As needed, If symptoms worsen  Contact information:  [address]  Emergency Department  [facility] [address] [phone]                              Medication List        START taking these medications      ciprofloxacin-dexAMETHasone 0.3-0.1% 0.3-0.1 % Drps  Commonly known as: CIPRODEX  Place 4 drops into both ears 2 (two) times daily.            ASK your doctor about these medications      AEROCHAMBER MV inhaler  Generic drug: inhalation spacing device     albuterol 90 mcg/actuation inhaler  Commonly known as: PROVENTIL/VENTOLIN HFA     DULERA 100-5 mcg/actuation Hfaa  Generic drug: mometasone-formoterol     naloxone 4 mg/actuation Spry  Commonly known as: NARCAN  4mg by nasal route as needed for opioid overdose; may repeat every 2-3 minutes in alternating nostrils until medical help arrives. Call 911               Where to Get Your Medications        You can get these medications from any pharmacy    Bring a paper prescription for each of these medications  ciprofloxacin-dexAMETHasone 0.3-0.1% 0.3-0.1 % Drps        Current Discharge Medication List            ED Diagnosis  1. Otitis externa of both ears, unspecified chronicity, unspecified type                            Clinical Impression:  Final diagnoses:  [H60.93] Otitis externa of both ears, unspecified chronicity, unspecified type (Primary)          ED Disposition Condition    Discharge Stable          ED Prescriptions       Medication Sig Dispense Start Date End Date Auth.  Provider    ciprofloxacin-dexAMETHasone 0.3-0.1% (CIPRODEX) 0.3-0.1 % DrpS Place 4 drops into both ears 2 (two) times daily. 7.5 mL 9/7/2024 -- Rohit Johnson Jr., MD          Follow-up Information       Follow up With Specialties Details Why Contact Info    Fide Steen, FNP-C Family Medicine Schedule an appointment as soon as possible for a visit in 1 week  [address] [facility] [address]  [phone]      [facility] - Emergency Dept Emergency Medicine  As needed, If symptoms worsen [address]  Emergency Department  [facility] [id number] [phone]             Rohit Johnson Jr., MD  09/07/24 0311